# Patient Record
Sex: FEMALE | Race: WHITE | Employment: UNEMPLOYED
[De-identification: names, ages, dates, MRNs, and addresses within clinical notes are randomized per-mention and may not be internally consistent; named-entity substitution may affect disease eponyms.]

---

## 2017-01-24 ENCOUNTER — OFFICE VISIT (OUTPATIENT)
Dept: GASTROENTEROLOGY | Facility: CLINIC | Age: 57
End: 2017-01-24

## 2017-01-24 VITALS
OXYGEN SATURATION: 97 % | DIASTOLIC BLOOD PRESSURE: 128 MMHG | BODY MASS INDEX: 37.44 KG/M2 | TEMPERATURE: 98.2 F | SYSTOLIC BLOOD PRESSURE: 199 MMHG | WEIGHT: 247 LBS | HEART RATE: 74 BPM | HEIGHT: 68 IN

## 2017-01-24 DIAGNOSIS — K21.9 GASTROESOPHAGEAL REFLUX DISEASE WITHOUT ESOPHAGITIS: Primary | ICD-10-CM

## 2017-01-24 DIAGNOSIS — B19.20 HEPATITIS C VIRUS INFECTION WITHOUT HEPATIC COMA, UNSPECIFIED CHRONICITY: ICD-10-CM

## 2017-01-24 DIAGNOSIS — R76.8 HEPATITIS C ANTIBODY POSITIVE IN BLOOD: ICD-10-CM

## 2017-01-24 PROCEDURE — 99203 OFFICE O/P NEW LOW 30 MIN: CPT | Performed by: INTERNAL MEDICINE

## 2017-01-24 RX ORDER — CLONIDINE HYDROCHLORIDE 0.2 MG/1
0.2 TABLET ORAL 3 TIMES DAILY
COMMUNITY
End: 2017-08-23 | Stop reason: SINTOL

## 2017-01-24 ASSESSMENT — ENCOUNTER SYMPTOMS
EYE ITCHING: 1
SORE THROAT: 0
ABDOMINAL PAIN: 1
NAUSEA: 1
EYE PAIN: 1
COUGH: 0
VOMITING: 0
ABDOMINAL DISTENTION: 1
EYE REDNESS: 1
CONSTIPATION: 0
SHORTNESS OF BREATH: 1
DIARRHEA: 0
ANAL BLEEDING: 0
BLOOD IN STOOL: 0
TROUBLE SWALLOWING: 1
BACK PAIN: 1
RECTAL PAIN: 0

## 2017-02-07 ENCOUNTER — OFFICE VISIT (OUTPATIENT)
Dept: GASTROENTEROLOGY | Facility: CLINIC | Age: 57
End: 2017-02-07

## 2017-02-07 DIAGNOSIS — B19.20 HEPATITIS C VIRUS INFECTION WITHOUT HEPATIC COMA, UNSPECIFIED CHRONICITY: Primary | ICD-10-CM

## 2017-02-07 DIAGNOSIS — R74.8 ELEVATED LIVER ENZYMES: ICD-10-CM

## 2017-02-07 DIAGNOSIS — K21.9 GASTROESOPHAGEAL REFLUX DISEASE WITHOUT ESOPHAGITIS: ICD-10-CM

## 2017-02-07 PROCEDURE — 99213 OFFICE O/P EST LOW 20 MIN: CPT | Performed by: INTERNAL MEDICINE

## 2017-02-07 RX ORDER — BUPROPION HYDROCHLORIDE 150 MG/1
150 TABLET ORAL DAILY
COMMUNITY
Start: 2017-02-04 | End: 2017-08-23

## 2017-02-07 ASSESSMENT — ENCOUNTER SYMPTOMS
SORE THROAT: 0
EYE ITCHING: 1
COUGH: 0
TROUBLE SWALLOWING: 0
ANAL BLEEDING: 0
RECTAL PAIN: 0
BACK PAIN: 1
SHORTNESS OF BREATH: 0
CONSTIPATION: 0
ABDOMINAL PAIN: 0
EYE REDNESS: 1
NAUSEA: 1
VOMITING: 0
DIARRHEA: 0
BLOOD IN STOOL: 0
EYE PAIN: 1
ABDOMINAL DISTENTION: 1

## 2017-02-08 ENCOUNTER — TELEPHONE (OUTPATIENT)
Dept: GASTROENTEROLOGY | Facility: CLINIC | Age: 57
End: 2017-02-08

## 2017-02-08 DIAGNOSIS — B19.20 HEPATITIS C VIRUS INFECTION WITHOUT HEPATIC COMA, UNSPECIFIED CHRONICITY: Primary | ICD-10-CM

## 2017-02-08 DIAGNOSIS — R74.8 ELEVATED LIVER ENZYMES: ICD-10-CM

## 2017-02-09 ENCOUNTER — HOSPITAL ENCOUNTER (OUTPATIENT)
Age: 57
Setting detail: SPECIMEN
Discharge: HOME | End: 2017-02-09
Payer: MEDICAID

## 2017-02-09 DIAGNOSIS — B19.20 HEPATITIS C VIRUS INFECTION WITHOUT HEPATIC COMA, UNSPECIFIED CHRONICITY: ICD-10-CM

## 2017-02-09 DIAGNOSIS — R74.8 ELEVATED LIVER ENZYMES: ICD-10-CM

## 2017-02-09 LAB
AFP: 3.4 UG/L
AMPHETAMINE SCREEN URINE: NEGATIVE
BARBITURATE SCREEN URINE: NEGATIVE
BENZODIAZEPINE SCREEN, URINE: NEGATIVE
BUPRENORPHINE URINE: ABNORMAL
CANNABINOID SCREEN URINE: POSITIVE
COCAINE METABOLITE, URINE: NEGATIVE
HAV AB SERPL IA-ACNC: REACTIVE
HEPATITIS B SURFACE ANTIGEN: NONREACTIVE
MDMA URINE: ABNORMAL
METHADONE SCREEN, URINE: NEGATIVE
METHAMPHETAMINE, URINE: ABNORMAL
OPIATES, URINE: NEGATIVE
OXYCODONE SCREEN URINE: NEGATIVE
PHENCYCLIDINE, URINE: NEGATIVE
PROPOXYPHENE, URINE: ABNORMAL
TEST INFORMATION: ABNORMAL
TRICYCLIC ANTIDEPRESSANTS, UR: ABNORMAL

## 2017-02-09 PROCEDURE — 82977 ASSAY OF GGT: CPT

## 2017-02-09 PROCEDURE — 82105 ALPHA-FETOPROTEIN SERUM: CPT

## 2017-02-09 PROCEDURE — 83883 ASSAY NEPHELOMETRY NOT SPEC: CPT

## 2017-02-09 PROCEDURE — 84460 ALANINE AMINO (ALT) (SGPT): CPT

## 2017-02-09 PROCEDURE — 84520 ASSAY OF UREA NITROGEN: CPT

## 2017-02-09 PROCEDURE — 84450 TRANSFERASE (AST) (SGOT): CPT

## 2017-02-09 PROCEDURE — 87340 HEPATITIS B SURFACE AG IA: CPT

## 2017-02-09 PROCEDURE — 86704 HEP B CORE ANTIBODY TOTAL: CPT

## 2017-02-09 PROCEDURE — 86708 HEPATITIS A ANTIBODY: CPT

## 2017-02-09 PROCEDURE — 36415 COLL VENOUS BLD VENIPUNCTURE: CPT

## 2017-02-09 PROCEDURE — 80307 DRUG TEST PRSMV CHEM ANLYZR: CPT

## 2017-02-10 LAB — HEPATITIS B CORE TOTAL ANTIBODY: NONREACTIVE

## 2017-02-13 LAB
ALANINE AMINOTRANSFERASE, FIBROMETER: 67 U/L (ref 5–40)
ALPHA-2-MACROGLOBULIN, FIBROMETER: 321 MG/DL (ref 131–293)
ASPARTATE AMINOTRANSFERASE, FIBROMETER: 41 U/L (ref 9–40)
CIRRHOMETER PATIENT SCORE: 0.01
EER FIBROMETER REPORT: ABNORMAL
FIBROMETER INTERPRETATION: ABNORMAL
FIBROMETER PATIENT SCORE: 0.38
FIBROMETER PLATELET COUNT: 234
FIBROMETER PROTHROMBIN INDEX: 109 % (ref 90–120)
FIBROSIS METAVIR CLASSIFICATION: ABNORMAL
GAMMA GLUTAMYL TRANSFERASE, FIBROMETER: 62 U/L (ref 7–33)
INFLAMETER METAVIR CLASSIFICATION: ABNORMAL
INFLAMETER PATIENT SCORE: 0.42
UREA NITROGEN, FIBROMETER: 14 MG/DL (ref 7–20)

## 2017-02-15 ENCOUNTER — TELEPHONE (OUTPATIENT)
Dept: GASTROENTEROLOGY | Facility: CLINIC | Age: 57
End: 2017-02-15

## 2017-02-20 ENCOUNTER — NURSE ONLY (OUTPATIENT)
Dept: GASTROENTEROLOGY | Facility: CLINIC | Age: 57
End: 2017-02-20

## 2017-02-20 VITALS — WEIGHT: 249.6 LBS | BODY MASS INDEX: 40.11 KG/M2 | TEMPERATURE: 97.9 F | HEIGHT: 66 IN

## 2017-02-20 DIAGNOSIS — Z23 NEED FOR VIRAL IMMUNIZATION: Primary | ICD-10-CM

## 2017-02-20 PROCEDURE — 90471 IMMUNIZATION ADMIN: CPT | Performed by: INTERNAL MEDICINE

## 2017-02-20 PROCEDURE — 90746 HEPB VACCINE 3 DOSE ADULT IM: CPT | Performed by: INTERNAL MEDICINE

## 2017-03-22 ENCOUNTER — OFFICE VISIT (OUTPATIENT)
Dept: GASTROENTEROLOGY | Age: 57
End: 2017-03-22
Payer: MEDICAID

## 2017-03-22 VITALS
HEIGHT: 68 IN | WEIGHT: 255 LBS | TEMPERATURE: 97.5 F | SYSTOLIC BLOOD PRESSURE: 203 MMHG | DIASTOLIC BLOOD PRESSURE: 106 MMHG | HEART RATE: 69 BPM | BODY MASS INDEX: 38.65 KG/M2 | OXYGEN SATURATION: 98 %

## 2017-03-22 DIAGNOSIS — B19.20 HEPATITIS C VIRUS INFECTION WITHOUT HEPATIC COMA, UNSPECIFIED CHRONICITY: Primary | ICD-10-CM

## 2017-03-22 PROCEDURE — 90471 IMMUNIZATION ADMIN: CPT | Performed by: INTERNAL MEDICINE

## 2017-03-22 PROCEDURE — 90746 HEPB VACCINE 3 DOSE ADULT IM: CPT | Performed by: INTERNAL MEDICINE

## 2017-08-23 ENCOUNTER — TELEPHONE (OUTPATIENT)
Dept: GASTROENTEROLOGY | Age: 57
End: 2017-08-23

## 2018-03-28 ENCOUNTER — TELEPHONE (OUTPATIENT)
Dept: GASTROENTEROLOGY | Age: 58
End: 2018-03-28

## 2018-03-28 NOTE — TELEPHONE ENCOUNTER
Called patient to schedule her an appointment to see Dr Starlene Klinefelter so we can reapply for hepatitis c treatment. Patient last seen about a year ago. Patient declines appointment.

## 2020-06-11 ENCOUNTER — HOSPITAL ENCOUNTER (EMERGENCY)
Age: 60
Discharge: ANOTHER HOSPITAL | End: 2020-06-11
Attending: EMERGENCY MEDICINE
Payer: MEDICAID

## 2020-06-11 ENCOUNTER — APPOINTMENT (OUTPATIENT)
Dept: CT IMAGING | Age: 60
End: 2020-06-11
Payer: MEDICAID

## 2020-06-11 ENCOUNTER — APPOINTMENT (OUTPATIENT)
Dept: MRI IMAGING | Age: 60
DRG: 044 | End: 2020-06-11
Payer: MEDICAID

## 2020-06-11 ENCOUNTER — HOSPITAL ENCOUNTER (INPATIENT)
Age: 60
LOS: 6 days | Discharge: HOME HEALTH | DRG: 044 | End: 2020-06-17
Attending: EMERGENCY MEDICINE | Admitting: PSYCHIATRY & NEUROLOGY
Payer: MEDICAID

## 2020-06-11 ENCOUNTER — APPOINTMENT (OUTPATIENT)
Dept: GENERAL RADIOLOGY | Age: 60
DRG: 044 | End: 2020-06-11
Payer: MEDICAID

## 2020-06-11 VITALS
HEIGHT: 68 IN | TEMPERATURE: 97.7 F | HEART RATE: 75 BPM | BODY MASS INDEX: 40.01 KG/M2 | WEIGHT: 264 LBS | OXYGEN SATURATION: 98 % | RESPIRATION RATE: 15 BRPM | DIASTOLIC BLOOD PRESSURE: 92 MMHG | SYSTOLIC BLOOD PRESSURE: 213 MMHG

## 2020-06-11 PROBLEM — I61.3 PONTINE HEMORRHAGE (HCC): Status: ACTIVE | Noted: 2020-06-11

## 2020-06-11 PROBLEM — I62.9 INTRACRANIAL HEMORRHAGE (HCC): Status: ACTIVE | Noted: 2020-06-11

## 2020-06-11 LAB
ABSOLUTE EOS #: 0.1 K/UL (ref 0–0.4)
ABSOLUTE IMMATURE GRANULOCYTE: ABNORMAL K/UL (ref 0–0.3)
ABSOLUTE LYMPH #: 1.2 K/UL (ref 1–4.8)
ABSOLUTE MONO #: 0.4 K/UL (ref 0.1–1.3)
ALBUMIN SERPL-MCNC: 3.6 G/DL (ref 3.5–5.2)
ALBUMIN SERPL-MCNC: 3.7 G/DL (ref 3.5–5.2)
ALBUMIN/GLOBULIN RATIO: 1.1 (ref 1–2.5)
ALBUMIN/GLOBULIN RATIO: ABNORMAL (ref 1–2.5)
ALP BLD-CCNC: 104 U/L (ref 35–104)
ALP BLD-CCNC: 104 U/L (ref 35–104)
ALT SERPL-CCNC: 44 U/L (ref 5–33)
ALT SERPL-CCNC: 45 U/L (ref 5–33)
ANION GAP SERPL CALCULATED.3IONS-SCNC: 12 MMOL/L (ref 9–17)
ANION GAP SERPL CALCULATED.3IONS-SCNC: 12 MMOL/L (ref 9–17)
AST SERPL-CCNC: 29 U/L
AST SERPL-CCNC: 29 U/L
BASOPHILS # BLD: 1 % (ref 0–2)
BASOPHILS ABSOLUTE: 0 K/UL (ref 0–0.2)
BILIRUB SERPL-MCNC: 0.22 MG/DL (ref 0.3–1.2)
BILIRUB SERPL-MCNC: 0.29 MG/DL (ref 0.3–1.2)
BNP INTERPRETATION: ABNORMAL
BUN BLDV-MCNC: 23 MG/DL (ref 6–20)
BUN BLDV-MCNC: 26 MG/DL (ref 6–20)
BUN/CREAT BLD: ABNORMAL (ref 9–20)
BUN/CREAT BLD: ABNORMAL (ref 9–20)
CALCIUM SERPL-MCNC: 8.3 MG/DL (ref 8.6–10.4)
CALCIUM SERPL-MCNC: 8.7 MG/DL (ref 8.6–10.4)
CHLORIDE BLD-SCNC: 101 MMOL/L (ref 98–107)
CHLORIDE BLD-SCNC: 102 MMOL/L (ref 98–107)
CO2: 22 MMOL/L (ref 20–31)
CO2: 23 MMOL/L (ref 20–31)
CREAT SERPL-MCNC: 1.09 MG/DL (ref 0.5–0.9)
CREAT SERPL-MCNC: 1.09 MG/DL (ref 0.5–0.9)
DIFFERENTIAL TYPE: ABNORMAL
EKG ATRIAL RATE: 76 BPM
EKG P AXIS: 69 DEGREES
EKG P-R INTERVAL: 200 MS
EKG Q-T INTERVAL: 426 MS
EKG QRS DURATION: 106 MS
EKG QTC CALCULATION (BAZETT): 479 MS
EKG R AXIS: 28 DEGREES
EKG T AXIS: 37 DEGREES
EKG VENTRICULAR RATE: 76 BPM
EOSINOPHILS RELATIVE PERCENT: 3 % (ref 0–4)
GFR AFRICAN AMERICAN: >60 ML/MIN
GFR AFRICAN AMERICAN: >60 ML/MIN
GFR NON-AFRICAN AMERICAN: 44 ML/MIN
GFR NON-AFRICAN AMERICAN: 52 ML/MIN
GFR NON-AFRICAN AMERICAN: 52 ML/MIN
GFR SERPL CREATININE-BSD FRML MDRD: 54 ML/MIN
GFR SERPL CREATININE-BSD FRML MDRD: ABNORMAL ML/MIN/{1.73_M2}
GLUCOSE BLD-MCNC: 187 MG/DL (ref 65–105)
GLUCOSE BLD-MCNC: 201 MG/DL (ref 65–105)
GLUCOSE BLD-MCNC: 237 MG/DL (ref 65–105)
GLUCOSE BLD-MCNC: 333 MG/DL (ref 65–105)
GLUCOSE BLD-MCNC: 346 MG/DL (ref 70–99)
GLUCOSE BLD-MCNC: 414 MG/DL (ref 70–99)
GLUCOSE BLD-MCNC: 468 MG/DL (ref 65–105)
HCT VFR BLD CALC: 41.4 % (ref 36–46)
HEMOGLOBIN: 13.9 G/DL (ref 12–16)
IMMATURE GRANULOCYTES: ABNORMAL %
INR BLD: 0.9
LYMPHOCYTES # BLD: 21 % (ref 24–44)
MCH RBC QN AUTO: 29.6 PG (ref 26–34)
MCHC RBC AUTO-ENTMCNC: 33.5 G/DL (ref 31–37)
MCV RBC AUTO: 88.3 FL (ref 80–100)
MONOCYTES # BLD: 6 % (ref 1–7)
NRBC AUTOMATED: ABNORMAL PER 100 WBC
PARTIAL THROMBOPLASTIN TIME: 25.8 SEC (ref 24–36)
PDW BLD-RTO: 13.7 % (ref 11.5–14.9)
PLATELET # BLD: 237 K/UL (ref 150–450)
PLATELET ESTIMATE: ABNORMAL
PMV BLD AUTO: 8.8 FL (ref 6–12)
POC CREATININE: 1.24 MG/DL (ref 0.51–1.19)
POTASSIUM SERPL-SCNC: 4 MMOL/L (ref 3.7–5.3)
POTASSIUM SERPL-SCNC: 4 MMOL/L (ref 3.7–5.3)
PRO-BNP: 769 PG/ML
PROTHROMBIN TIME: 12.5 SEC (ref 11.8–14.6)
RBC # BLD: 4.69 M/UL (ref 4–5.2)
RBC # BLD: ABNORMAL 10*6/UL
SEG NEUTROPHILS: 69 % (ref 36–66)
SEGMENTED NEUTROPHILS ABSOLUTE COUNT: 4.1 K/UL (ref 1.3–9.1)
SODIUM BLD-SCNC: 135 MMOL/L (ref 135–144)
SODIUM BLD-SCNC: 137 MMOL/L (ref 135–144)
TOTAL PROTEIN: 6.8 G/DL (ref 6.4–8.3)
TOTAL PROTEIN: 7 G/DL (ref 6.4–8.3)
TROPONIN INTERP: ABNORMAL
TROPONIN T: ABNORMAL NG/ML
TROPONIN, HIGH SENSITIVITY: 15 NG/L (ref 0–14)
TROPONIN, HIGH SENSITIVITY: 19 NG/L (ref 0–14)
TROPONIN, HIGH SENSITIVITY: 21 NG/L (ref 0–14)
WBC # BLD: 5.9 K/UL (ref 3.5–11)
WBC # BLD: ABNORMAL 10*3/UL

## 2020-06-11 PROCEDURE — 2580000003 HC RX 258: Performed by: EMERGENCY MEDICINE

## 2020-06-11 PROCEDURE — 6360000004 HC RX CONTRAST MEDICATION: Performed by: EMERGENCY MEDICINE

## 2020-06-11 PROCEDURE — 6360000002 HC RX W HCPCS

## 2020-06-11 PROCEDURE — 82947 ASSAY GLUCOSE BLOOD QUANT: CPT

## 2020-06-11 PROCEDURE — 70450 CT HEAD/BRAIN W/O DYE: CPT

## 2020-06-11 PROCEDURE — 80307 DRUG TEST PRSMV CHEM ANLYZR: CPT

## 2020-06-11 PROCEDURE — 93005 ELECTROCARDIOGRAM TRACING: CPT | Performed by: EMERGENCY MEDICINE

## 2020-06-11 PROCEDURE — 6370000000 HC RX 637 (ALT 250 FOR IP): Performed by: STUDENT IN AN ORGANIZED HEALTH CARE EDUCATION/TRAINING PROGRAM

## 2020-06-11 PROCEDURE — 92523 SPEECH SOUND LANG COMPREHEN: CPT

## 2020-06-11 PROCEDURE — APPSS45 APP SPLIT SHARED TIME 31-45 MINUTES: Performed by: PHYSICIAN ASSISTANT

## 2020-06-11 PROCEDURE — 6370000000 HC RX 637 (ALT 250 FOR IP): Performed by: PSYCHIATRY & NEUROLOGY

## 2020-06-11 PROCEDURE — 85610 PROTHROMBIN TIME: CPT

## 2020-06-11 PROCEDURE — 2500000003 HC RX 250 WO HCPCS: Performed by: STUDENT IN AN ORGANIZED HEALTH CARE EDUCATION/TRAINING PROGRAM

## 2020-06-11 PROCEDURE — 2000000003 HC NEURO ICU R&B

## 2020-06-11 PROCEDURE — 97162 PT EVAL MOD COMPLEX 30 MIN: CPT

## 2020-06-11 PROCEDURE — 85025 COMPLETE CBC W/AUTO DIFF WBC: CPT

## 2020-06-11 PROCEDURE — 2580000003 HC RX 258: Performed by: STUDENT IN AN ORGANIZED HEALTH CARE EDUCATION/TRAINING PROGRAM

## 2020-06-11 PROCEDURE — 84484 ASSAY OF TROPONIN QUANT: CPT

## 2020-06-11 PROCEDURE — 85730 THROMBOPLASTIN TIME PARTIAL: CPT

## 2020-06-11 PROCEDURE — 36415 COLL VENOUS BLD VENIPUNCTURE: CPT

## 2020-06-11 PROCEDURE — 2500000003 HC RX 250 WO HCPCS: Performed by: EMERGENCY MEDICINE

## 2020-06-11 PROCEDURE — 36620 INSERTION CATHETER ARTERY: CPT

## 2020-06-11 PROCEDURE — 80053 COMPREHEN METABOLIC PANEL: CPT

## 2020-06-11 PROCEDURE — 70551 MRI BRAIN STEM W/O DYE: CPT

## 2020-06-11 PROCEDURE — 99285 EMERGENCY DEPT VISIT HI MDM: CPT

## 2020-06-11 PROCEDURE — 82565 ASSAY OF CREATININE: CPT

## 2020-06-11 PROCEDURE — 71045 X-RAY EXAM CHEST 1 VIEW: CPT

## 2020-06-11 PROCEDURE — 96375 TX/PRO/DX INJ NEW DRUG ADDON: CPT

## 2020-06-11 PROCEDURE — 97530 THERAPEUTIC ACTIVITIES: CPT

## 2020-06-11 PROCEDURE — 99223 1ST HOSP IP/OBS HIGH 75: CPT | Performed by: PSYCHIATRY & NEUROLOGY

## 2020-06-11 PROCEDURE — 83880 ASSAY OF NATRIURETIC PEPTIDE: CPT

## 2020-06-11 PROCEDURE — 6360000002 HC RX W HCPCS: Performed by: STUDENT IN AN ORGANIZED HEALTH CARE EDUCATION/TRAINING PROGRAM

## 2020-06-11 PROCEDURE — 96365 THER/PROPH/DIAG IV INF INIT: CPT

## 2020-06-11 PROCEDURE — 93010 ELECTROCARDIOGRAM REPORT: CPT | Performed by: INTERNAL MEDICINE

## 2020-06-11 PROCEDURE — 70498 CT ANGIOGRAPHY NECK: CPT

## 2020-06-11 PROCEDURE — 96374 THER/PROPH/DIAG INJ IV PUSH: CPT

## 2020-06-11 RX ORDER — HYDRALAZINE HYDROCHLORIDE 20 MG/ML
10 INJECTION INTRAMUSCULAR; INTRAVENOUS EVERY 6 HOURS PRN
Status: DISCONTINUED | OUTPATIENT
Start: 2020-06-11 | End: 2020-06-16

## 2020-06-11 RX ORDER — LABETALOL 200 MG/1
200 TABLET, FILM COATED ORAL 3 TIMES DAILY
Status: DISCONTINUED | OUTPATIENT
Start: 2020-06-11 | End: 2020-06-13

## 2020-06-11 RX ORDER — TIZANIDINE 4 MG/1
4 TABLET ORAL NIGHTLY
COMMUNITY

## 2020-06-11 RX ORDER — LABETALOL HYDROCHLORIDE 5 MG/ML
10 INJECTION, SOLUTION INTRAVENOUS EVERY 10 MIN PRN
Status: DISCONTINUED | OUTPATIENT
Start: 2020-06-11 | End: 2020-06-16

## 2020-06-11 RX ORDER — AMLODIPINE BESYLATE 10 MG/1
10 TABLET ORAL DAILY
Status: DISCONTINUED | OUTPATIENT
Start: 2020-06-11 | End: 2020-06-17 | Stop reason: HOSPADM

## 2020-06-11 RX ORDER — SODIUM CHLORIDE 0.9 % (FLUSH) 0.9 %
10 SYRINGE (ML) INJECTION PRN
Status: DISCONTINUED | OUTPATIENT
Start: 2020-06-11 | End: 2020-06-11 | Stop reason: HOSPADM

## 2020-06-11 RX ORDER — ENALAPRILAT 2.5 MG/2ML
1.25 INJECTION INTRAVENOUS ONCE
Status: DISCONTINUED | OUTPATIENT
Start: 2020-06-11 | End: 2020-06-11 | Stop reason: HOSPADM

## 2020-06-11 RX ORDER — 0.9 % SODIUM CHLORIDE 0.9 %
80 INTRAVENOUS SOLUTION INTRAVENOUS ONCE
Status: COMPLETED | OUTPATIENT
Start: 2020-06-11 | End: 2020-06-11

## 2020-06-11 RX ORDER — INSULIN GLARGINE 100 [IU]/ML
5 INJECTION, SOLUTION SUBCUTANEOUS NIGHTLY
Status: DISCONTINUED | OUTPATIENT
Start: 2020-06-11 | End: 2020-06-15

## 2020-06-11 RX ORDER — ONDANSETRON 2 MG/ML
4 INJECTION INTRAMUSCULAR; INTRAVENOUS EVERY 6 HOURS PRN
Status: DISCONTINUED | OUTPATIENT
Start: 2020-06-11 | End: 2020-06-17 | Stop reason: HOSPADM

## 2020-06-11 RX ORDER — HYDRALAZINE HYDROCHLORIDE 20 MG/ML
10 INJECTION INTRAMUSCULAR; INTRAVENOUS ONCE
Status: COMPLETED | OUTPATIENT
Start: 2020-06-11 | End: 2020-06-11

## 2020-06-11 RX ORDER — ACETAMINOPHEN 325 MG/1
650 TABLET ORAL EVERY 4 HOURS PRN
Status: DISCONTINUED | OUTPATIENT
Start: 2020-06-11 | End: 2020-06-17 | Stop reason: HOSPADM

## 2020-06-11 RX ORDER — LISINOPRIL 10 MG/1
10 TABLET ORAL DAILY
Status: DISCONTINUED | OUTPATIENT
Start: 2020-06-11 | End: 2020-06-11

## 2020-06-11 RX ORDER — ONDANSETRON 2 MG/ML
INJECTION INTRAMUSCULAR; INTRAVENOUS
Status: COMPLETED
Start: 2020-06-11 | End: 2020-06-11

## 2020-06-11 RX ORDER — SODIUM CHLORIDE 0.9 % (FLUSH) 0.9 %
10 SYRINGE (ML) INJECTION EVERY 12 HOURS SCHEDULED
Status: DISCONTINUED | OUTPATIENT
Start: 2020-06-11 | End: 2020-06-17 | Stop reason: HOSPADM

## 2020-06-11 RX ORDER — PROMETHAZINE HYDROCHLORIDE 25 MG/1
12.5 TABLET ORAL EVERY 6 HOURS PRN
Status: DISCONTINUED | OUTPATIENT
Start: 2020-06-11 | End: 2020-06-17 | Stop reason: HOSPADM

## 2020-06-11 RX ORDER — SPIRONOLACTONE 50 MG/1
50 TABLET, FILM COATED ORAL DAILY
COMMUNITY

## 2020-06-11 RX ORDER — SODIUM CHLORIDE 9 MG/ML
INJECTION, SOLUTION INTRAVENOUS CONTINUOUS
Status: DISCONTINUED | OUTPATIENT
Start: 2020-06-11 | End: 2020-06-13

## 2020-06-11 RX ORDER — NICOTINE POLACRILEX 4 MG
15 LOZENGE BUCCAL PRN
Status: DISCONTINUED | OUTPATIENT
Start: 2020-06-11 | End: 2020-06-17 | Stop reason: HOSPADM

## 2020-06-11 RX ORDER — DEXTROSE MONOHYDRATE 25 G/50ML
12.5 INJECTION, SOLUTION INTRAVENOUS PRN
Status: DISCONTINUED | OUTPATIENT
Start: 2020-06-11 | End: 2020-06-17 | Stop reason: HOSPADM

## 2020-06-11 RX ORDER — FENTANYL CITRATE 50 UG/ML
50 INJECTION, SOLUTION INTRAMUSCULAR; INTRAVENOUS ONCE
Status: COMPLETED | OUTPATIENT
Start: 2020-06-11 | End: 2020-06-11

## 2020-06-11 RX ORDER — ATORVASTATIN CALCIUM 80 MG/1
80 TABLET, FILM COATED ORAL NIGHTLY
Status: DISCONTINUED | OUTPATIENT
Start: 2020-06-11 | End: 2020-06-13

## 2020-06-11 RX ORDER — MIDAZOLAM HYDROCHLORIDE 1 MG/ML
1 INJECTION INTRAMUSCULAR; INTRAVENOUS ONCE
Status: COMPLETED | OUTPATIENT
Start: 2020-06-11 | End: 2020-06-11

## 2020-06-11 RX ORDER — SODIUM CHLORIDE 0.9 % (FLUSH) 0.9 %
10 SYRINGE (ML) INJECTION PRN
Status: DISCONTINUED | OUTPATIENT
Start: 2020-06-11 | End: 2020-06-17 | Stop reason: HOSPADM

## 2020-06-11 RX ORDER — DEXTROSE MONOHYDRATE 50 MG/ML
100 INJECTION, SOLUTION INTRAVENOUS PRN
Status: DISCONTINUED | OUTPATIENT
Start: 2020-06-11 | End: 2020-06-17 | Stop reason: HOSPADM

## 2020-06-11 RX ADMIN — SODIUM CHLORIDE 18 MG/HR: 9 INJECTION, SOLUTION INTRAVENOUS at 08:44

## 2020-06-11 RX ADMIN — SODIUM CHLORIDE 5 MG/HR: 9 INJECTION, SOLUTION INTRAVENOUS at 04:17

## 2020-06-11 RX ADMIN — INSULIN LISPRO 2 UNITS: 100 INJECTION, SOLUTION INTRAVENOUS; SUBCUTANEOUS at 18:10

## 2020-06-11 RX ADMIN — DEXTROSE MONOHYDRATE 20 MG/HR: 50 INJECTION, SOLUTION INTRAVENOUS at 07:42

## 2020-06-11 RX ADMIN — SODIUM CHLORIDE 10 MG/HR: 9 INJECTION, SOLUTION INTRAVENOUS at 14:00

## 2020-06-11 RX ADMIN — AMLODIPINE BESYLATE 10 MG: 10 TABLET ORAL at 17:32

## 2020-06-11 RX ADMIN — INSULIN LISPRO 2 UNITS: 100 INJECTION, SOLUTION INTRAVENOUS; SUBCUTANEOUS at 20:08

## 2020-06-11 RX ADMIN — LABETALOL HCL 200 MG: 200 TABLET, FILM COATED ORAL at 14:04

## 2020-06-11 RX ADMIN — SODIUM CHLORIDE 12.5 MG/HR: 9 INJECTION, SOLUTION INTRAVENOUS at 10:34

## 2020-06-11 RX ADMIN — INSULIN GLARGINE 5 UNITS: 100 INJECTION, SOLUTION SUBCUTANEOUS at 20:08

## 2020-06-11 RX ADMIN — Medication 10 ML: at 03:56

## 2020-06-11 RX ADMIN — FENTANYL CITRATE 50 MCG: 50 INJECTION, SOLUTION INTRAMUSCULAR; INTRAVENOUS at 06:15

## 2020-06-11 RX ADMIN — Medication 10 MG: at 08:28

## 2020-06-11 RX ADMIN — Medication 10 MG: at 07:42

## 2020-06-11 RX ADMIN — IOPAMIDOL 75 ML: 755 INJECTION, SOLUTION INTRAVENOUS at 03:56

## 2020-06-11 RX ADMIN — SODIUM CHLORIDE 10 MG/HR: 9 INJECTION, SOLUTION INTRAVENOUS at 23:39

## 2020-06-11 RX ADMIN — SODIUM CHLORIDE, PRESERVATIVE FREE 10 ML: 5 INJECTION INTRAVENOUS at 08:40

## 2020-06-11 RX ADMIN — LABETALOL HCL 200 MG: 200 TABLET, FILM COATED ORAL at 10:18

## 2020-06-11 RX ADMIN — ONDANSETRON 2 MG: 2 INJECTION, SOLUTION INTRAMUSCULAR; INTRAVENOUS at 06:23

## 2020-06-11 RX ADMIN — HYDRALAZINE HYDROCHLORIDE 10 MG: 20 INJECTION INTRAMUSCULAR; INTRAVENOUS at 18:03

## 2020-06-11 RX ADMIN — SODIUM CHLORIDE 80 ML: 9 INJECTION, SOLUTION INTRAVENOUS at 03:56

## 2020-06-11 RX ADMIN — INSULIN LISPRO 12 UNITS: 100 INJECTION, SOLUTION INTRAVENOUS; SUBCUTANEOUS at 08:39

## 2020-06-11 RX ADMIN — HYDRALAZINE HYDROCHLORIDE 10 MG: 20 INJECTION INTRAMUSCULAR; INTRAVENOUS at 05:35

## 2020-06-11 RX ADMIN — LISINOPRIL 10 MG: 10 TABLET ORAL at 10:18

## 2020-06-11 RX ADMIN — HYDRALAZINE HYDROCHLORIDE 10 MG: 20 INJECTION INTRAMUSCULAR; INTRAVENOUS at 14:36

## 2020-06-11 RX ADMIN — SODIUM CHLORIDE: 9 INJECTION, SOLUTION INTRAVENOUS at 10:13

## 2020-06-11 RX ADMIN — MIDAZOLAM HYDROCHLORIDE 1 MG: 1 INJECTION, SOLUTION INTRAMUSCULAR; INTRAVENOUS at 12:25

## 2020-06-11 RX ADMIN — DEXTROSE MONOHYDRATE 20 MG/HR: 50 INJECTION, SOLUTION INTRAVENOUS at 06:20

## 2020-06-11 RX ADMIN — SODIUM CHLORIDE 7.5 MG/HR: 9 INJECTION, SOLUTION INTRAVENOUS at 18:14

## 2020-06-11 RX ADMIN — INSULIN LISPRO 4 UNITS: 100 INJECTION, SOLUTION INTRAVENOUS; SUBCUTANEOUS at 14:03

## 2020-06-11 RX ADMIN — ATORVASTATIN CALCIUM 80 MG: 80 TABLET, FILM COATED ORAL at 20:08

## 2020-06-11 RX ADMIN — SODIUM CHLORIDE 10 MG/HR: 9 INJECTION, SOLUTION INTRAVENOUS at 21:17

## 2020-06-11 RX ADMIN — LABETALOL HCL 200 MG: 200 TABLET, FILM COATED ORAL at 20:08

## 2020-06-11 ASSESSMENT — PAIN DESCRIPTION - PAIN TYPE
TYPE: ACUTE PAIN;CHRONIC PAIN
TYPE: CHRONIC PAIN

## 2020-06-11 ASSESSMENT — PAIN SCALES - GENERAL
PAINLEVEL_OUTOF10: 10

## 2020-06-11 ASSESSMENT — PAIN DESCRIPTION - DESCRIPTORS
DESCRIPTORS: ACHING
DESCRIPTORS: HEADACHE;ACHING

## 2020-06-11 ASSESSMENT — PAIN DESCRIPTION - ORIENTATION
ORIENTATION: RIGHT
ORIENTATION: LEFT

## 2020-06-11 ASSESSMENT — PAIN DESCRIPTION - LOCATION
LOCATION: HEAD;LEG
LOCATION: KNEE

## 2020-06-11 ASSESSMENT — PAIN DESCRIPTION - FREQUENCY: FREQUENCY: CONTINUOUS

## 2020-06-11 NOTE — ED PROVIDER NOTES
of education: Not on file    Highest education level: Not on file   Occupational History    Not on file   Social Needs    Financial resource strain: Not on file    Food insecurity     Worry: Not on file     Inability: Not on file    Transportation needs     Medical: Not on file     Non-medical: Not on file   Tobacco Use    Smoking status: Current Every Day Smoker     Packs/day: 0.50     Years: 35.00     Pack years: 17.50     Types: Cigarettes    Smokeless tobacco: Never Used   Substance and Sexual Activity    Alcohol use: No    Drug use: No    Sexual activity: Not on file   Lifestyle    Physical activity     Days per week: Not on file     Minutes per session: Not on file    Stress: Not on file   Relationships    Social connections     Talks on phone: Not on file     Gets together: Not on file     Attends Worship service: Not on file     Active member of club or organization: Not on file     Attends meetings of clubs or organizations: Not on file     Relationship status: Not on file    Intimate partner violence     Fear of current or ex partner: Not on file     Emotionally abused: Not on file     Physically abused: Not on file     Forced sexual activity: Not on file   Other Topics Concern    Not on file   Social History Narrative    Not on file       Family History   Problem Relation Age of Onset    High Blood Pressure Mother     Cancer Sister         lymphoma       Allergies:    Bactrim [sulfamethoxazole-trimethoprim]    Home Medications:  Prior to Admission medications    Medication Sig Start Date End Date Taking? Authorizing Provider   ibuprofen (ADVIL;MOTRIN) 600 MG tablet Take 1 tablet by mouth every 6 hours as needed for Pain 6/1/16   Chris Dominguez MD       REVIEW OF SYSTEMS    (2-9 systems for level 4, 10 or more for level 5)        Gen: No Fever, No chills  EYES: No blurry visiion, no double vision  HENT: No sore throat, No runny nose.  No cough  CV: No CP , No palpitation  RESP: No SOB, the head and neck was performed with the administration of intravenous contrast. Multiplanar reformatted images are provided for review. MIP images are provided for review. Stenosis of the internal carotid arteries measured using NASCET criteria. Dose modulation, iterative reconstruction, and/or weight based adjustment of the mA/kV was utilized to reduce the radiation dose to as low as reasonably achievable. COMPARISON: None. HISTORY: ORDERING SYSTEM PROVIDED HISTORY: cva TECHNOLOGIST PROVIDED HISTORY: cva Reason for Exam: slurred speech, stroke alert, patient unable to hold still or follow instructions Acuity: Acute Type of Exam: Initial FINDINGS: CTA NECK: AORTIC ARCH/ARCH VESSELS: No dissection or arterial injury. No significant stenosis of the brachiocephalic or subclavian arteries. CAROTID ARTERIES: No dissection, arterial injury, or hemodynamically significant stenosis by NASCET criteria. VERTEBRAL ARTERIES: No dissection, arterial injury, or significant stenosis. SOFT TISSUES: The lung apices are clear. No cervical or superior mediastinal lymphadenopathy. The larynx and pharynx are unremarkable. No acute abnormality of the salivary and thyroid glands. BONES: No acute osseous abnormality. CTA HEAD: ANTERIOR CIRCULATION: No significant stenosis of the intracranial internal carotid, anterior cerebral, or middle cerebral arteries. No aneurysm. POSTERIOR CIRCULATION: No significant stenosis of the vertebral, basilar, or posterior cerebral arteries. No aneurysm. OTHER: No dural venous sinus thrombosis on this non-dedicated study. BRAIN: No mass effect or midline shift. No extra-axial fluid collection. The gray-white differentiation is maintained. Pontine hemorrhage seen to better advantage on noncontrast CT. Unremarkable CTA of the head and neck. CONSULTS:  IP CONSULT TO NEUROSURGERY  IP CONSULT TO CRITICAL CARE    CRITICAL CARE:  Please see attending note    FINAL IMPRESSION     1.  Intracranial

## 2020-06-11 NOTE — H&P
Cardene drip   - PRN Labetalol 10  - Neuro checks per protocol    CARDIOVASCULAR:  - Goal SBP <140  - Cardene drip  Try to wean   - Labetalol 100 TID  - Norvasc 10 mg   - PRN Labetalol   - Continue telemetry    PULMONARY:  -Maintaining oxygen saturation on room air  -Chest x-ray no acute cardiopulmonary    RENAL/FLUID/ELECTROLYTE:  - BUN 26/ Creatinine 1.09  - IVF:75/ml/hr   - Replace electrolytes PRN  - Daily BMP    GI/NUTRITION:  NUTRITION:  No diet orders on file  - Bowel regimen: Senna   - GI prophylaxis: Not indicated     ID:  - Tmax :  98.1  - Continue to monitor for fevers  - Daily CBC    HEME:   - H&H 13.9 & 41.4  - Platelets: 323  - Daily CBC    ENDOCRINE:  - Continue to monitor blood glucose, goal <180  -Glucose 346, repeat 444  - Lantus 5   -ISS    OTHER:  - PT/OT/ST : Ordered    PROPHYLAXIS:  Stress ulcer:     DVT PROPHYLAXIS:  -Hold off for 24 hours         DISPOSITION: NICU      Sridhar Bennett MD  Neuro Critical Care Service   Pager [phone]  6/11/2020     7:39 AM

## 2020-06-11 NOTE — ED NOTES
Bed: 11  Expected date: 6/11/20  Expected time: 3:33 AM  Means of arrival:   Comments:  Slurred speech Yodit Alonso, [facility]  06/11/20 [id number]

## 2020-06-11 NOTE — FLOWSHEET NOTE
[facility] CARE DEPARTMENT - [address]     Emergency/Trauma Note    PATIENT NAME: Alma Rosa Child    Shift date: 6/10/2020  Shift day: Wednesday  Shift # 3    Room # ED16   Name: Alma Rosa Hernandes            Age: 64 y.o. Gender: female          Protestant: [address] of Congregational: None    Trauma/Incident type: Stroke Alert  Admit Date & Time: 6/11/2020  4:53 AM  TRAUMA NAME: N/A    PATIENT/EVENT DESCRIPTION:  Alma Rosa Hernandes is a 64 y.o. female who arrived to ED16 and was paged out as a \"Stroke Alert. \" Per report, patient experienced \"sudden onset of headache, left-sided facial droop, and leg weakness. \" Patient was sitting up, awake, and talking with slurred speech, when  visited. Pt to be admitted to 0527/0527-01. SPIRITUAL ASSESSMENT/INTERVENTION:   responded to page and gathered patient information outside room.  introduced herself to patient in room. Patient was coping well and receptive of visit. Patient indicated that she did not want her son, Anselmo Harris, contacted at this time. Patient indicated that she \"did not want to scare him. \" Patient confirmed that her friend, [address], is aware of her arrival to the ED. Patient requested that Ray be added to her emergency contact list.  provided comfort, support, and care to patient in room. Patient thanked  for visit and support. PATIENT BELONGINGS:  No belongings noted    ANY BELONGINGS OF SIGNIFICANT VALUE NOTED:  N/A    REGISTRATION STAFF NOTIFIED? Yes      WHAT IS YOUR SPIRITUAL CARE PLAN FOR THIS PATIENT?:  [facility] can make follow-up visit, per request. Silvia Alyssa can be reached 24/7 via [place].     Electronically signed by Estrellita Palma, on 6/11/2020 at 9:09 AM.  Delbert Buckley  [phone]

## 2020-06-11 NOTE — ED NOTES
65 yo F R arm R leg weakness slurred speech,   Pontine bleed, no midline shift,   Sbp 200s, nicardipine gtt,   Spoke with Dr Elliot Juares, RN  06/11/20 1984

## 2020-06-11 NOTE — ED NOTES
Bed: 08  Expected date: 6/11/20  Expected time: 4:43 AM  Means of arrival: [facility]  Comments:  [facility]   abby Riojas RN  06/11/20 3411

## 2020-06-11 NOTE — ED PROVIDER NOTES
[facility]     Emergency Department     Faculty Attestation    I performed a history and physical examination of the patient and discussed management with the resident. I have reviewed and agree with the residents findings including all diagnostic interpretations, and treatment plans as written. Any areas of disagreement are noted on the chart. I was personally present for the key portions of any procedures. I have documented in the chart those procedures where I was not present during the key portions. I have reviewed the emergency nurses triage note. I agree with the chief complaint, past medical history, past surgical history, allergies, medications, social and family history as documented unless otherwise noted below. Documentation of the HPI, Physical Exam and Medical Decision Making performed by scribjaya is based on my personal performance of the HPI, PE and MDM. For Physician Assistant/ Nurse Practitioner cases/documentation I have personally evaluated this patient and have completed at least one if not all key elements of the E/M (history, physical exam, and MDM). Additional findings are as noted. 63 yo F transfer from [facility], 1230 pt woke with ha, slurred speech, L facial , r arm r leg weakness, no fever, no injury  pe chayo, slurred speech, L facial droop, weak R arm R leg, r arm drift,     Neuro icu admitting, bp improved, cardene gtt,   EKG Interpretation    Interpreted by me  Normal sinus, hr 79, no ischemia, , normal axis, qtc 488,     CRITICAL CARE: There was a high probability of clinically significant/life threatening deterioration in this patient's condition which required my urgent intervention. Total critical care time was 20 minutes. This excludes any time for separately reportable procedures.        Ada 24, DO  06/11/20 0501       Junior Bose, DO  06/11/20 Ilene 1429, DO  06/11/20

## 2020-06-11 NOTE — CONSULTS
Department of Neurosurgery                                            Nurse Practitioner Consult Note      Reason for Consult:  Pontine Bleed  Requesting Physician:  Dr. Bryan Ozuna  Neurosurgeon:   [] Dr. Valeria Councilman  [x] Dr. Halima Scott  [] Dr. Sanaz Al  [] Dr. Corey Valdovinos      History Obtained From:  patient    CHIEF COMPLAINT:         Chief Complaint   Patient presents with    Cerebrovascular Accident       HISTORY OF PRESENT ILLNESS:       The patient is a 64 y.o. female who presents with new onset right facial droop, right arm and right leg weakness, and slurred speech as of 12:30 this am. She has a history of hypertension and admits to being prescribed medication to control this, but has not taken for at least the last two months. She denies any anticoagulation or antiplatelet use. She admits to a mild headache. She denies nausea, vomiting, and blurred vision. She denies numbness or tingling of her face and extremities. PAST MEDICAL HISTORY :       Past Medical History:        Diagnosis Date    Chronic headache     Chronic headache     Elevated liver enzymes     Esophageal reflux     Exposure to hepatitis C     Hepatitis C     1a or 1b genotype    Hypertension     Major depression     Major depression        Past Surgical History:    No past surgical history on file. Social History:   Social History     Socioeconomic History    Marital status:       Spouse name: Not on file    Number of children: Not on file    Years of education: Not on file    Highest education level: Not on file   Occupational History    Not on file   Social Needs    Financial resource strain: Not on file    Food insecurity     Worry: Not on file     Inability: Not on file    Transportation needs     Medical: Not on file     Non-medical: Not on file   Tobacco Use    Smoking status: Current Every Day Smoker     Packs/day: 0.50     Years: 35.00     Pack years: 17.50     Types: Cigarettes    Smokeless tobacco: Never Used 06/11/2020    MCH 29.6 06/11/2020    MCHC 33.5 06/11/2020    RDW 13.7 06/11/2020    LYMPHOPCT 21 06/11/2020    MONOPCT 6 06/11/2020    BASOPCT 1 06/11/2020    MONOSABS 0.40 06/11/2020    LYMPHSABS 1.20 06/11/2020    EOSABS 0.10 06/11/2020    BASOSABS 0.00 06/11/2020    DIFFTYPE NOT REPORTED 06/11/2020     BMP:    Lab Results   Component Value Date     06/11/2020    K 4.0 06/11/2020     06/11/2020    CO2 23 06/11/2020    BUN 26 06/11/2020    LABALBU 3.6 06/11/2020    CREATININE 1.24 06/11/2020    CREATININE 1.09 06/11/2020    CALCIUM 8.7 06/11/2020    GFRAA >60 06/11/2020    LABGLOM 44 06/11/2020    GLUCOSE 346 06/11/2020       Radiology Review: Ct Head Wo Contrast    Result Date: 6/11/2020  EXAMINATION: CT OF THE HEAD WITHOUT CONTRAST  6/11/2020 3:36 am TECHNIQUE: CT of the head was performed without the administration of intravenous contrast. Dose modulation, iterative reconstruction, and/or weight based adjustment of the mA/kV was utilized to reduce the radiation dose to as low as reasonably achievable. COMPARISON: None. HISTORY: ORDERING SYSTEM PROVIDED HISTORY: cva TECHNOLOGIST PROVIDED HISTORY: cva Reason for Exam: slurred speech Acuity: Acute Type of Exam: Initial FINDINGS: BRAIN/VENTRICLES: There is a 1.7 x 0.8 cm focal intraparenchymal hemorrhage in the ventral linda. There is no mass effect or midline shift. No abnormal extra-axial fluid collection. The gray-white differentiation is maintained without evidence of an acute infarct. There is no evidence of hydrocephalus. ORBITS: The visualized portion of the orbits demonstrate no acute abnormality. SINUSES: The visualized paranasal sinuses and mastoid air cells demonstrate no acute abnormality. SOFT TISSUES/SKULL:  No acute abnormality of the visualized skull or soft tissues. Acute hemorrhage ventral linda. Dr. Tarsha Whitehead aware.      Cta Head Neck W Contrast    Result Date: 6/11/2020  EXAMINATION: CTA OF THE HEAD AND NECK WITH CONTRAST 6/11/2020

## 2020-06-11 NOTE — ED PROVIDER NOTES
and oriented, right arm and right leg weakness and dysarthria  Psych: Cooperative, appropriate mood and affect  -----------------------  -----------------------  Pt seen at: 0330  Code stroke called at: [id number] [address]  Last known normal: [0030  Spoke with Neurology: Orlene Colder  Is pt an endovascular candidate? [no]  -----------------------  NIH STROKE SCALE = [3]  -- 1a Level of consciousness: --  [x] Alert, keenly responsive (0)  [] Not alert, but arousable by minor stimulation (1)  [] Not alert; requires repeated stimulation (2)  [] Unresponsive or responds only with reflex (3)  -- 1b Level of consciousness questions: --  What is the month? What is your age? [x] Answers two questions correctly (0)  [] Answers one question correctly (1)  [] Answers neither question correctly (2)  -- 1c Level of consciousness commands: --  Open and close your eyes.  and release your hand  [x]  Performs both tasks correctly (0)  [] Performs one task correctly (1)  [] Performs neither task correctly (2)  -- 2 Best Gaze --  [x] Normal (0)  [] Partial gaze palsy (1)  [] Forced deviation (2)  -- 3 Visual --  [x] No visual loss (0)  [] Partial hemianopia (1)  [] Complete hemianopia (2)  [] Bilateral hemianopia (3)  -- 4 Facial palsy --  [x] Normal symmetric movements (0)  [] Minor paralysis (1)  [] Partial paralysis (2)  [] Complete paralysis of one or both sides (3)  -- 5 Motor Arm --  5a Left arm. [x] No drift (0)  [] Drift (1)  [] Some effort against gravity (2)  [] No effort against gravity; limb falls (3)  [] No movement (4)  5b Right arm. [] No drift (0)  [x] Drift (1)  [] Some effort against gravity (2)  [] No effort against gravity; limb falls (3)  [] No movement (4)  -- 6 Motor Leg --   6a Left Leg  [x] No drift (0)  [] Drift (1)  [] Some effort against gravity (2)  [] No effort against gravity; limb falls (3)  [] No movement (4)   6a Right leg.   [] No drift (0)  [x] Drift (1)  [] Some effort against gravity (2)  [] No effort against Result Value    Seg Neutrophils 69 (*)     Lymphocytes 21 (*)     All other components within normal limits   COMPREHENSIVE METABOLIC PANEL W/ REFLEX TO MG FOR LOW K - Abnormal; Notable for the following components:    Glucose 346 (*)     BUN 26 (*)     CREATININE 1.09 (*)     ALT 44 (*)     Total Bilirubin 0.22 (*)     GFR Non- 52 (*)     All other components within normal limits   BRAIN NATRIURETIC PEPTIDE - Abnormal; Notable for the following components:    Pro- (*)     All other components within normal limits   TROPONIN - Abnormal; Notable for the following components:    Troponin, High Sensitivity 21 (*)     All other components within normal limits   CREATININE W/GFR POINT OF CARE - Abnormal; Notable for the following components:    POC Creatinine 1.24 (*)     GFR Comment 54 (*)     GFR Non- 44 (*)     All other components within normal limits   PROTIME-INR   APTT   TROPONIN     EMERGENCY DEPARTMENTCOURSE:         Vitals:    Vitals:    06/11/20 0407 06/11/20 0408 06/11/20 0415 06/11/20 0440   BP:   (!) 213/92    Pulse:   75    Resp:   15    Temp:    97.7 °F (36.5 °C)   TempSrc:    Oral   SpO2:  98% 98%    Weight: 264 lb (119.7 kg)      Height: 5' 8\" (1.727 m)          The patient was given the following medications while in the emergency department:  Orders Placed This Encounter   Medications    iopamidol (ISOVUE-370) 76 % injection 75 mL    0.9 % sodium chloride bolus    DISCONTD: sodium chloride flush 0.9 % injection 10 mL    DISCONTD: niCARdipine (CARDENE) 25 mg in sodium chloride 0.9 % 250 mL infusion    DISCONTD: enalaprilat (VASOTEC) injection 1.25 mg     CONSULTS:  None    FINAL IMPRESSION      1. Intracranial hypertension          DISPOSITION/PLAN   DISPOSITION Decision To Transfer 06/11/2020 03:58:15 AM      PATIENT REFERRED TO:  No follow-up provider specified.   DISCHARGE MEDICATIONS:  Discharge Medication List as of 6/11/2020  4:46 AM        ANNITA FRAZIER

## 2020-06-12 ENCOUNTER — APPOINTMENT (OUTPATIENT)
Dept: CT IMAGING | Age: 60
DRG: 044 | End: 2020-06-12
Payer: MEDICAID

## 2020-06-12 LAB
ABSOLUTE EOS #: 0.09 K/UL (ref 0–0.44)
ABSOLUTE IMMATURE GRANULOCYTE: <0.03 K/UL (ref 0–0.3)
ABSOLUTE LYMPH #: 1.39 K/UL (ref 1.1–3.7)
ABSOLUTE MONO #: 0.41 K/UL (ref 0.1–1.2)
ANION GAP SERPL CALCULATED.3IONS-SCNC: 15 MMOL/L (ref 9–17)
BASOPHILS # BLD: 0 % (ref 0–2)
BASOPHILS ABSOLUTE: <0.03 K/UL (ref 0–0.2)
BUN BLDV-MCNC: 29 MG/DL (ref 6–20)
BUN/CREAT BLD: ABNORMAL (ref 9–20)
CALCIUM SERPL-MCNC: 8.6 MG/DL (ref 8.6–10.4)
CHLORIDE BLD-SCNC: 106 MMOL/L (ref 98–107)
CHOLESTEROL/HDL RATIO: 4.9
CHOLESTEROL: 163 MG/DL
CO2: 20 MMOL/L (ref 20–31)
CREAT SERPL-MCNC: 1.43 MG/DL (ref 0.5–0.9)
DIFFERENTIAL TYPE: ABNORMAL
EKG ATRIAL RATE: 79 BPM
EKG P AXIS: 69 DEGREES
EKG P-R INTERVAL: 194 MS
EKG Q-T INTERVAL: 426 MS
EKG QRS DURATION: 108 MS
EKG QTC CALCULATION (BAZETT): 488 MS
EKG R AXIS: 27 DEGREES
EKG T AXIS: 51 DEGREES
EKG VENTRICULAR RATE: 79 BPM
EOSINOPHILS RELATIVE PERCENT: 1 % (ref 1–4)
ESTIMATED AVERAGE GLUCOSE: 220 MG/DL
GFR AFRICAN AMERICAN: 46 ML/MIN
GFR NON-AFRICAN AMERICAN: 38 ML/MIN
GFR SERPL CREATININE-BSD FRML MDRD: ABNORMAL ML/MIN/{1.73_M2}
GFR SERPL CREATININE-BSD FRML MDRD: ABNORMAL ML/MIN/{1.73_M2}
GLUCOSE BLD-MCNC: 154 MG/DL (ref 65–105)
GLUCOSE BLD-MCNC: 167 MG/DL (ref 65–105)
GLUCOSE BLD-MCNC: 203 MG/DL (ref 65–105)
GLUCOSE BLD-MCNC: 219 MG/DL (ref 70–99)
HBA1C MFR BLD: 9.3 % (ref 4–6)
HCT VFR BLD CALC: 40.3 % (ref 36.3–47.1)
HDLC SERPL-MCNC: 33 MG/DL
HEMOGLOBIN: 13.2 G/DL (ref 11.9–15.1)
IMMATURE GRANULOCYTES: 0 %
LDL CHOLESTEROL: 75 MG/DL (ref 0–130)
LYMPHOCYTES # BLD: 20 % (ref 24–43)
MAGNESIUM: 2 MG/DL (ref 1.6–2.6)
MCH RBC QN AUTO: 29.6 PG (ref 25.2–33.5)
MCHC RBC AUTO-ENTMCNC: 32.8 G/DL (ref 28.4–34.8)
MCV RBC AUTO: 90.4 FL (ref 82.6–102.9)
MONOCYTES # BLD: 6 % (ref 3–12)
NRBC AUTOMATED: 0 PER 100 WBC
PDW BLD-RTO: 13.5 % (ref 11.8–14.4)
PHOSPHORUS: 3.5 MG/DL (ref 2.6–4.5)
PLATELET # BLD: 274 K/UL (ref 138–453)
PLATELET ESTIMATE: ABNORMAL
PMV BLD AUTO: 10.9 FL (ref 8.1–13.5)
POTASSIUM SERPL-SCNC: 4 MMOL/L (ref 3.7–5.3)
RBC # BLD: 4.46 M/UL (ref 3.95–5.11)
RBC # BLD: ABNORMAL 10*6/UL
SEG NEUTROPHILS: 73 % (ref 36–65)
SEGMENTED NEUTROPHILS ABSOLUTE COUNT: 5.08 K/UL (ref 1.5–8.1)
SODIUM BLD-SCNC: 141 MMOL/L (ref 135–144)
TRIGL SERPL-MCNC: 277 MG/DL
TROPONIN INTERP: ABNORMAL
TROPONIN INTERP: ABNORMAL
TROPONIN T: ABNORMAL NG/ML
TROPONIN T: ABNORMAL NG/ML
TROPONIN, HIGH SENSITIVITY: 29 NG/L (ref 0–14)
TROPONIN, HIGH SENSITIVITY: 30 NG/L (ref 0–14)
VLDLC SERPL CALC-MCNC: ABNORMAL MG/DL (ref 1–30)
WBC # BLD: 7 K/UL (ref 3.5–11.3)
WBC # BLD: ABNORMAL 10*3/UL

## 2020-06-12 PROCEDURE — 70450 CT HEAD/BRAIN W/O DYE: CPT

## 2020-06-12 PROCEDURE — 6370000000 HC RX 637 (ALT 250 FOR IP): Performed by: STUDENT IN AN ORGANIZED HEALTH CARE EDUCATION/TRAINING PROGRAM

## 2020-06-12 PROCEDURE — 83036 HEMOGLOBIN GLYCOSYLATED A1C: CPT

## 2020-06-12 PROCEDURE — 2580000003 HC RX 258: Performed by: STUDENT IN AN ORGANIZED HEALTH CARE EDUCATION/TRAINING PROGRAM

## 2020-06-12 PROCEDURE — 92507 TX SP LANG VOICE COMM INDIV: CPT

## 2020-06-12 PROCEDURE — 80048 BASIC METABOLIC PNL TOTAL CA: CPT

## 2020-06-12 PROCEDURE — 97166 OT EVAL MOD COMPLEX 45 MIN: CPT | Performed by: OCCUPATIONAL THERAPIST

## 2020-06-12 PROCEDURE — 2060000000 HC ICU INTERMEDIATE R&B

## 2020-06-12 PROCEDURE — 84100 ASSAY OF PHOSPHORUS: CPT

## 2020-06-12 PROCEDURE — 6360000002 HC RX W HCPCS: Performed by: NURSE PRACTITIONER

## 2020-06-12 PROCEDURE — 80061 LIPID PANEL: CPT

## 2020-06-12 PROCEDURE — 93010 ELECTROCARDIOGRAM REPORT: CPT | Performed by: INTERNAL MEDICINE

## 2020-06-12 PROCEDURE — 99233 SBSQ HOSP IP/OBS HIGH 50: CPT | Performed by: PSYCHIATRY & NEUROLOGY

## 2020-06-12 PROCEDURE — 97535 SELF CARE MNGMENT TRAINING: CPT | Performed by: OCCUPATIONAL THERAPIST

## 2020-06-12 PROCEDURE — 85025 COMPLETE CBC W/AUTO DIFF WBC: CPT

## 2020-06-12 PROCEDURE — 6360000002 HC RX W HCPCS: Performed by: STUDENT IN AN ORGANIZED HEALTH CARE EDUCATION/TRAINING PROGRAM

## 2020-06-12 PROCEDURE — 2500000003 HC RX 250 WO HCPCS: Performed by: STUDENT IN AN ORGANIZED HEALTH CARE EDUCATION/TRAINING PROGRAM

## 2020-06-12 PROCEDURE — 82947 ASSAY GLUCOSE BLOOD QUANT: CPT

## 2020-06-12 PROCEDURE — 97530 THERAPEUTIC ACTIVITIES: CPT

## 2020-06-12 PROCEDURE — 83735 ASSAY OF MAGNESIUM: CPT

## 2020-06-12 PROCEDURE — 84484 ASSAY OF TROPONIN QUANT: CPT

## 2020-06-12 RX ORDER — TIZANIDINE 4 MG/1
4 TABLET ORAL NIGHTLY
Status: DISCONTINUED | OUTPATIENT
Start: 2020-06-12 | End: 2020-06-13

## 2020-06-12 RX ORDER — SENNA PLUS 8.6 MG/1
1 TABLET ORAL NIGHTLY
Status: DISCONTINUED | OUTPATIENT
Start: 2020-06-12 | End: 2020-06-17 | Stop reason: HOSPADM

## 2020-06-12 RX ORDER — TIZANIDINE 4 MG/1
4 TABLET ORAL NIGHTLY
Status: DISCONTINUED | OUTPATIENT
Start: 2020-06-12 | End: 2020-06-12

## 2020-06-12 RX ORDER — CALCIUM CARBONATE 200(500)MG
500 TABLET,CHEWABLE ORAL 3 TIMES DAILY PRN
Status: DISCONTINUED | OUTPATIENT
Start: 2020-06-12 | End: 2020-06-17 | Stop reason: HOSPADM

## 2020-06-12 RX ORDER — 0.9 % SODIUM CHLORIDE 0.9 %
500 INTRAVENOUS SOLUTION INTRAVENOUS ONCE
Status: COMPLETED | OUTPATIENT
Start: 2020-06-12 | End: 2020-06-12

## 2020-06-12 RX ADMIN — INSULIN LISPRO 4 UNITS: 100 INJECTION, SOLUTION INTRAVENOUS; SUBCUTANEOUS at 12:38

## 2020-06-12 RX ADMIN — ACETAMINOPHEN 650 MG: 325 TABLET ORAL at 20:49

## 2020-06-12 RX ADMIN — SODIUM CHLORIDE 500 ML: 0.9 INJECTION, SOLUTION INTRAVENOUS at 05:00

## 2020-06-12 RX ADMIN — ATORVASTATIN CALCIUM 80 MG: 80 TABLET, FILM COATED ORAL at 20:50

## 2020-06-12 RX ADMIN — TIZANIDINE 4 MG: 4 TABLET ORAL at 20:49

## 2020-06-12 RX ADMIN — HYDRALAZINE HYDROCHLORIDE 10 MG: 20 INJECTION INTRAMUSCULAR; INTRAVENOUS at 04:01

## 2020-06-12 RX ADMIN — INSULIN LISPRO 4 UNITS: 100 INJECTION, SOLUTION INTRAVENOUS; SUBCUTANEOUS at 09:12

## 2020-06-12 RX ADMIN — TIZANIDINE 4 MG: 4 TABLET ORAL at 05:13

## 2020-06-12 RX ADMIN — SODIUM CHLORIDE 7.5 MG/HR: 9 INJECTION, SOLUTION INTRAVENOUS at 02:09

## 2020-06-12 RX ADMIN — ENOXAPARIN SODIUM 30 MG: 30 INJECTION SUBCUTANEOUS at 09:44

## 2020-06-12 RX ADMIN — INSULIN GLARGINE 5 UNITS: 100 INJECTION, SOLUTION SUBCUTANEOUS at 20:50

## 2020-06-12 RX ADMIN — INSULIN LISPRO 2 UNITS: 100 INJECTION, SOLUTION INTRAVENOUS; SUBCUTANEOUS at 17:51

## 2020-06-12 RX ADMIN — HYDRALAZINE HYDROCHLORIDE 10 MG: 20 INJECTION INTRAMUSCULAR; INTRAVENOUS at 13:42

## 2020-06-12 RX ADMIN — ENOXAPARIN SODIUM 30 MG: 30 INJECTION SUBCUTANEOUS at 20:49

## 2020-06-12 RX ADMIN — ANTACID TABLETS 500 MG: 500 TABLET, CHEWABLE ORAL at 05:13

## 2020-06-12 RX ADMIN — INSULIN LISPRO 1 UNITS: 100 INJECTION, SOLUTION INTRAVENOUS; SUBCUTANEOUS at 20:50

## 2020-06-12 RX ADMIN — SODIUM CHLORIDE, PRESERVATIVE FREE 10 ML: 5 INJECTION INTRAVENOUS at 09:00

## 2020-06-12 ASSESSMENT — PAIN SCALES - GENERAL
PAINLEVEL_OUTOF10: 4
PAINLEVEL_OUTOF10: 0

## 2020-06-12 ASSESSMENT — PAIN DESCRIPTION - ONSET: ONSET: ON-GOING

## 2020-06-12 ASSESSMENT — PAIN DESCRIPTION - LOCATION: LOCATION: HEAD

## 2020-06-12 ASSESSMENT — PAIN DESCRIPTION - PAIN TYPE: TYPE: ACUTE PAIN

## 2020-06-12 ASSESSMENT — PAIN DESCRIPTION - DESCRIPTORS: DESCRIPTORS: HEADACHE

## 2020-06-12 ASSESSMENT — PAIN DESCRIPTION - PROGRESSION: CLINICAL_PROGRESSION: NOT CHANGED

## 2020-06-12 ASSESSMENT — PAIN - FUNCTIONAL ASSESSMENT: PAIN_FUNCTIONAL_ASSESSMENT: ACTIVITIES ARE NOT PREVENTED

## 2020-06-12 ASSESSMENT — PAIN DESCRIPTION - FREQUENCY: FREQUENCY: INTERMITTENT

## 2020-06-12 NOTE — PROGRESS NOTES
Units Subcutaneous TID     insulin lispro  0-6 Units Subcutaneous Nightly    insulin glargine  5 Units Subcutaneous Nightly    labetalol  200 mg Oral TID    amLODIPine  10 mg Oral Daily     CONTINUOUS INFUSIONS:   dextrose      niCARdipene (CARDENE) infusion Stopped (20)    sodium chloride 75 mL/hr at 20 1013     PRN MEDICATIONS:   calcium carbonate, sodium chloride flush, acetaminophen, promethazine **OR** ondansetron, labetalol, glucose, dextrose, glucagon (rDNA), dextrose, hydrALAZINE    VITALS:  Temperature Range: Temp: 98.4 °F (36.9 °C) Temp  Av.9 °F (36.6 °C)  Min: 97.6 °F (36.4 °C)  Max: 98.4 °F (36.9 °C)  BP Range: Systolic (11OZP), QJL:651 , Min:89 , BUQ:450     Diastolic (22KPY), WID:74, Min:37, Max:177    Pulse Range: Pulse  Av.8  Min: 60  Max: 83  Respiration Range: Resp  Av.2  Min: 13  Max: 24  Current Pulse Ox: SpO2: 93 %  24HR Pulse Ox Range: SpO2  Av.2 %  Min: 85 %  Max: 98 %  Patient Vitals for the past 12 hrs:   BP Temp Temp src Pulse Resp SpO2   20 0502 (!) 146/80 -- -- 78 22 93 %   20 0402 130/74 98.4 °F (36.9 °C) Oral 71 18 94 %   20 0347 -- -- -- 71 15 95 %   20 0332 -- -- -- 73 17 94 %   20 0317 -- -- -- 64 16 92 %   20 0302 (!) 115/58 -- -- 64 15 93 %   20 0300 -- -- -- 63 16 92 %   20 0245 -- -- -- 62 16 91 %   20 0230 -- -- -- 65 17 92 %   20 0215 -- -- -- 69 18 94 %   20 0200 114/63 -- -- 69 17 93 %   20 0145 -- -- -- 72 17 (!) 88 %   20 0130 -- -- -- 78 21 94 %   20 0115 -- -- -- 70 17 97 %   20 0100 -- -- -- 63 16 96 %   20 0045 -- -- -- 68 16 95 %   20 0030 -- -- -- 64 15 91 %   20 0015 -- -- -- 66 16 97 %   20 0000 (!) 100/54 98 °F (36.7 °C) Oral 62 15 98 %   20 2345 -- -- -- 68 15 98 %   20 2330 -- -- -- 60 15 97 %   20 2315 -- -- -- 62 15 98 %   20 2300 -- -- -- 70 19 98 %   20 2245 -- -- -- 68 14 seconds  6b. Motor right le - no drift; leg holds 30 degree position for full 5 seconds  7. Limb Ataxia:  0 - absent  8. Sensory:  0 - normal; no sensory loss  9. Best Language:  1 - mild to moderate aphasia; some obvious loss of fluency or facility of comprehension without significant limitation on ideas expressed or form of expression. Reduction of speech and/or comprehension, however, makes conversation about provided materials difficult or impossible. For example, in conversation about provided materials, examiner can identify picture or naming card content from patient's response. 10.  Dysarthria:  1 - mild to moderate, patient slurs at least some words and at worst, can be understood with some difficulty  11. Extinction and Inattention:  0 - no abnormality  TOTAL:     DRAINS:  [x] There are no drains for Neuro Critical Care to monitor at this time. ASSESSMENT AND PLAN:       This is a 64 y.o. female with history of hypertension, diabetes transfer from  [address] [place] [address] 12.30 am  yesterday with sudden onset of headache associated with dysarthria and left-sided facial droop with right arm and leg weakness. CT head demonstrating acute hemorrhage in ventral linda, CTA: Unremarkable. On Cardene drip with as needed labetalol.     Patient care will be discussed with attending, will reevaluate patient along with attending.      NEUROLOGIC:  - Imaging  : Repeat Heat CT stable intraparenchymal hemorrhage   - Goal SBP <140  - Neuro checks per protocol    CARDIOVASCULAR:  - Goal SBP <140  -Labetalol with holding parameter    - Amlodipine with holding parameter   - Continue telemetry    PULMONARY:  - Patient desaturates  During sleep  - Maintains O2 during awake above 98    RENAL/FLUID/ELECTROLYTE:  - BUN 29/ Creatinine 1.43  - Urine output ml/kg/hr  - IVF: 100  - Replace electrolytes PRN  - Daily BMP    GI/NUTRITION:  NUTRITION:  DIET CARB CONTROL;  - Bowel regimen: Senna  - GI

## 2020-06-12 NOTE — PROGRESS NOTES
Education & Training, Home Exercise Program, Patient/Caregiver Education & Training  Safety Devices  Type of devices: Call light within reach, Gait belt, Patient at risk for falls, Nurse notified, Chair alarm in place, Left in chair  Restraints  Initially in place: No     Therapy Time   Individual Concurrent Group Co-treatment   Time In 0825         Time Out 0850         Minutes 25                 [place]ay [place], [place]

## 2020-06-12 NOTE — PROGRESS NOTES
Notified THERESA Caban CM, that per Dr Nathalie Rosa pt appears approp for ARU.  Writer will initiate precert for ARU after OT eval.

## 2020-06-12 NOTE — CONSULTS
swing, Decreased head and trunk rotation, Shuffles  Distance: ~10 steps bed to chair  Comments: (pt requested not to sit up in chair b/c she's exhausted and wants to sleep; agreeable to participate with PT session)        OT: Pending, unable to evaluate yesterday due to lethargy from Versed. ST:     Pt presents with no apparent cognitive deficits at this time. + moderate dysarthria noted, no oral motor deficits. Further ST is recommended. Verbal and written education provided. Further therapy recommended at discharge.          Past Medical History:        Diagnosis Date    Chronic headache     Chronic headache     Elevated liver enzymes     Esophageal reflux     Exposure to hepatitis C     Hepatitis C     1a or 1b genotype    Hypertension     Major depression     Major depression        Past Surgical History:    No past surgical history on file. Allergies:     Allergies   Allergen Reactions    Bactrim [Sulfamethoxazole-Trimethoprim]         Current Medications:   Current Facility-Administered Medications: tiZANidine (ZANAFLEX) tablet 4 mg, 4 mg, Oral, Nightly  calcium carbonate (TUMS) chewable tablet 500 mg, 500 mg, Oral, TID PRN  senna (SENOKOT) tablet 8.6 mg, 1 tablet, Oral, Nightly  enoxaparin (LOVENOX) injection 30 mg, 30 mg, Subcutaneous, BID  sodium chloride flush 0.9 % injection 10 mL, 10 mL, Intravenous, 2 times per day  sodium chloride flush 0.9 % injection 10 mL, 10 mL, Intravenous, PRN  acetaminophen (TYLENOL) tablet 650 mg, 650 mg, Oral, Q4H PRN  promethazine (PHENERGAN) tablet 12.5 mg, 12.5 mg, Oral, Q6H PRN **OR** ondansetron (ZOFRAN) injection 4 mg, 4 mg, Intravenous, Q6H PRN  atorvastatin (LIPITOR) tablet 80 mg, 80 mg, Oral, Nightly  labetalol (NORMODYNE;TRANDATE) injection 10 mg, 10 mg, Intravenous, Q10 Min PRN  insulin lispro (HUMALOG) injection vial 0-12 Units, 0-12 Units, Subcutaneous, TID WC  insulin lispro (HUMALOG) injection vial 0-6 Units, 0-6 Units, Subcutaneous, Physically abused: Not on file     Forced sexual activity: Not on file   Other Topics Concern    Not on file   Social History Narrative    Not on file     Social/Functional History  Lives With: Alone  Type of Home: (\"camper\" per pt)  Home Layout: One level  Home Access: Stairs to enter without rails  Entrance Stairs - Number of Steps: 3  ADL Assistance: Independent  Ambulation Assistance: Independent  Transfer Assistance: Independent  Active : Yes  Mode of Transportation: Car  Occupation: On disability    Family History:       Problem Relation Age of Onset    High Blood Pressure Mother     Cancer Sister         lymphoma           BP (!) 95/58   Pulse 52   Temp 97.8 °F (36.6 °C)   Resp 14   Ht 5' 7\" (1.702 m)   Wt 254 lb 10.1 oz (115.5 kg)   LMP 11/09/2014   SpO2 93%   BMI 39.88 kg/m²       Diagnostics:  CBC   Lab Results   Component Value Date    WBC 7.0 06/12/2020    RBC 4.46 06/12/2020    HGB 13.2 06/12/2020    HCT 40.3 06/12/2020    MCV 90.4 06/12/2020    RDW 13.5 06/12/2020     06/12/2020     BMP    Lab Results   Component Value Date     06/12/2020    K 4.0 06/12/2020     06/12/2020    CO2 20 06/12/2020    BUN 29 06/12/2020     Uric Acid  No components found for: URIC  VITAMIN B12 No components found for: B12  PT/INR  No results found for: PTINR    Radiology:     Impression: Ms. Inez Block is a 64 y.o. male with a history of <principal problem not specified>    1. Intraparenchymal hemorrhage  2. Exposure hepatitis C/elevated liver enzymes  3. Hypertension hyperlipidemia-Norvasc, Lipitor, Cardene drip  4. History of depression  5. Diabetes-on insulin  6. Acute renal failure-creatinine increased to 1.43    Recommendations:  1. Diagnosis: Intraparenchymal hemorrhage  2. Therapy: Has PT needs, suspect will have OT needs though OT pending, speech needs for moderate dysarthria  3. Medical  Necessity: As above  4. Support: Clarify  5.  Rehab recommendation: Has PT and speech

## 2020-06-12 NOTE — PLAN OF CARE
Problem: Nutrition  Goal: Optimal nutrition therapy  Description: Nutrition Problem: Increased nutrient needs  Intervention: Food and/or Nutrient Delivery: Continue current diet, Start ONS  Nutritional Goals: Meet greater than 50% of estimated nutrient needs with intake of therapeutic diet. 6/12/2020 1256 by Elisa Hayward MS, RD, LD  Outcome: Ongoing  Note: Nutrition Problem: Increased nutrient needs  Intervention: Food and/or Nutrient Delivery: Continue current diet, Start ONS  Nutritional Goals: Meet greater than 50% of estimated nutrient needs with intake of therapeutic diet.

## 2020-06-12 NOTE — PROGRESS NOTES
Nutrition Assessment    Type and Reason for Visit: Initial, Positive Nutrition Screen(Wound)    Nutrition Recommendations: Send wound healing ONS twice daily. If swallowing difficulty observed, suggest NPO and SLP consult to evaluate swallowing. Nutrition Assessment: Pt admitted with slurred speech, difficulty walking, and headache. States she has a non-healing wound on the back of her left leg - says a table fell on her 5 months ago and has not yet healed. Says she did not see anyone about this wound d/t doctor office restrictions with current Covid pandemic. Reports good appetite PTA and denies any significant weight changes. Reports blood glucose runs below 150 mg/dL at home. C/o difficulty swallowing pancakes this morning (feels they \"got stuck\" in throat), but says she is able to swallow liquids well. Pt willing to receive wound healing ONS at this time. Malnutrition Assessment:  · Malnutrition Status: No malnutrition  · Context: Acute illness or injury  · Findings of the 6 clinical characteristics of malnutrition (Minimum of 2 out of 6 clinical characteristics is required to make the diagnosis of moderate or severe Protein Calorie Malnutrition based on AND/ASPEN Guidelines):  1. Energy Intake-Greater than 75% of estimated energy requirement, Greater than or equal to 1 month    2. Weight Loss-No significant weight loss(per pt),    3. Fat Loss-No significant subcutaneous fat loss,    4. Muscle Loss-No significant muscle mass loss,    5. Fluid Accumulation-Mild fluid accumulation, Extremities  6.  Strength-Not measured    Nutrition Risk Level:  Moderate    Nutrient Needs:  · Estimated Daily Total Kcal: 8313-7296 kcals/day  · Estimated Daily Protein (g):  gm/day    Nutrition Diagnosis:   · Problem: Increased nutrient needs  · Etiology: related to (healing)     Signs and symptoms:  as evidenced by (non-healing, open wound on LLE)    Objective Information:  · Nutrition-Focused Physical Findings: Glucose 203-219 mg/dL  · Wound Type: Open Wounds(Non-healing posterior LLE wound - open per pt)  · Current Nutrition Therapies:  · Oral Diet Orders: Carb Control 4 Carbs/Meal   · Oral Diet intake: 1-25%, 26-50%(Reports eating fruit this morning for breakfast)  · Oral Nutrition Supplement (ONS) Orders: None  · Anthropometric Measures:  · Ht: 5' 7\" (170.2 cm)   · Current Body Wt: 254 lb 10.1 oz (115.5 kg)  · Usual Body Wt: (250-264 lbs per pt)  · Ideal Body Wt: 135 lb (61.2 kg), % Ideal Body 189%  · BMI Classification: BMI > or equal to 40.0 Obese Class III    Nutrition Interventions:   Continue current diet, Start ONS  Continued Inpatient Monitoring, Education Not Indicated    Nutrition Evaluation:   · Evaluation: Goals set   · Goals: Meet greater than 50% of estimated nutrient needs with intake of therapeutic diet.     · Monitoring: Meal Intake, Supplement Intake, Diet Tolerance, Skin Integrity, Wound Healing, Weight, Pertinent Labs, Monitor Hemodynamic Status, Chewing/Swallowing      Electronically signed by Han Collazo, MS, RD, LD on 6/12/20 at 12:53 PM EDT    Contact Number: [phone]

## 2020-06-13 LAB
ABSOLUTE EOS #: 0.15 K/UL (ref 0–0.44)
ABSOLUTE IMMATURE GRANULOCYTE: <0.03 K/UL (ref 0–0.3)
ABSOLUTE LYMPH #: 2.14 K/UL (ref 1.1–3.7)
ABSOLUTE MONO #: 0.59 K/UL (ref 0.1–1.2)
ANION GAP SERPL CALCULATED.3IONS-SCNC: 9 MMOL/L (ref 9–17)
BASOPHILS # BLD: 0 % (ref 0–2)
BASOPHILS ABSOLUTE: <0.03 K/UL (ref 0–0.2)
BUN BLDV-MCNC: 26 MG/DL (ref 6–20)
BUN/CREAT BLD: ABNORMAL (ref 9–20)
CALCIUM SERPL-MCNC: 8.5 MG/DL (ref 8.6–10.4)
CHLORIDE BLD-SCNC: 108 MMOL/L (ref 98–107)
CO2: 22 MMOL/L (ref 20–31)
CREAT SERPL-MCNC: 1.14 MG/DL (ref 0.5–0.9)
DIFFERENTIAL TYPE: NORMAL
EOSINOPHILS RELATIVE PERCENT: 2 % (ref 1–4)
GFR AFRICAN AMERICAN: 59 ML/MIN
GFR NON-AFRICAN AMERICAN: 49 ML/MIN
GFR SERPL CREATININE-BSD FRML MDRD: ABNORMAL ML/MIN/{1.73_M2}
GFR SERPL CREATININE-BSD FRML MDRD: ABNORMAL ML/MIN/{1.73_M2}
GLUCOSE BLD-MCNC: 137 MG/DL (ref 65–105)
GLUCOSE BLD-MCNC: 163 MG/DL (ref 70–99)
GLUCOSE BLD-MCNC: 173 MG/DL (ref 65–105)
GLUCOSE BLD-MCNC: 175 MG/DL (ref 65–105)
GLUCOSE BLD-MCNC: 179 MG/DL (ref 65–105)
HCT VFR BLD CALC: 42.7 % (ref 36.3–47.1)
HEMOGLOBIN: 12.9 G/DL (ref 11.9–15.1)
IMMATURE GRANULOCYTES: 0 %
LV EF: 56 %
LVEF MODALITY: NORMAL
LYMPHOCYTES # BLD: 33 % (ref 24–43)
MCH RBC QN AUTO: 29.4 PG (ref 25.2–33.5)
MCHC RBC AUTO-ENTMCNC: 30.2 G/DL (ref 28.4–34.8)
MCV RBC AUTO: 97.3 FL (ref 82.6–102.9)
MONOCYTES # BLD: 9 % (ref 3–12)
NRBC AUTOMATED: 0 PER 100 WBC
PDW BLD-RTO: 13.8 % (ref 11.8–14.4)
PLATELET # BLD: 254 K/UL (ref 138–453)
PLATELET ESTIMATE: NORMAL
PMV BLD AUTO: 10.1 FL (ref 8.1–13.5)
POTASSIUM SERPL-SCNC: 4 MMOL/L (ref 3.7–5.3)
RBC # BLD: 4.39 M/UL (ref 3.95–5.11)
RBC # BLD: NORMAL 10*6/UL
SEG NEUTROPHILS: 56 % (ref 36–65)
SEGMENTED NEUTROPHILS ABSOLUTE COUNT: 3.65 K/UL (ref 1.5–8.1)
SODIUM BLD-SCNC: 139 MMOL/L (ref 135–144)
WBC # BLD: 6.6 K/UL (ref 3.5–11.3)
WBC # BLD: NORMAL 10*3/UL

## 2020-06-13 PROCEDURE — 85025 COMPLETE CBC W/AUTO DIFF WBC: CPT

## 2020-06-13 PROCEDURE — 36415 COLL VENOUS BLD VENIPUNCTURE: CPT

## 2020-06-13 PROCEDURE — 2060000000 HC ICU INTERMEDIATE R&B

## 2020-06-13 PROCEDURE — 93306 TTE W/DOPPLER COMPLETE: CPT

## 2020-06-13 PROCEDURE — 93005 ELECTROCARDIOGRAM TRACING: CPT | Performed by: PSYCHIATRY & NEUROLOGY

## 2020-06-13 PROCEDURE — 97535 SELF CARE MNGMENT TRAINING: CPT

## 2020-06-13 PROCEDURE — 6370000000 HC RX 637 (ALT 250 FOR IP): Performed by: STUDENT IN AN ORGANIZED HEALTH CARE EDUCATION/TRAINING PROGRAM

## 2020-06-13 PROCEDURE — 80048 BASIC METABOLIC PNL TOTAL CA: CPT

## 2020-06-13 PROCEDURE — 6360000002 HC RX W HCPCS: Performed by: STUDENT IN AN ORGANIZED HEALTH CARE EDUCATION/TRAINING PROGRAM

## 2020-06-13 PROCEDURE — 6370000000 HC RX 637 (ALT 250 FOR IP): Performed by: PSYCHIATRY & NEUROLOGY

## 2020-06-13 PROCEDURE — 99232 SBSQ HOSP IP/OBS MODERATE 35: CPT | Performed by: PSYCHIATRY & NEUROLOGY

## 2020-06-13 PROCEDURE — 97110 THERAPEUTIC EXERCISES: CPT

## 2020-06-13 PROCEDURE — 97116 GAIT TRAINING THERAPY: CPT

## 2020-06-13 PROCEDURE — 82947 ASSAY GLUCOSE BLOOD QUANT: CPT

## 2020-06-13 PROCEDURE — 6360000002 HC RX W HCPCS: Performed by: NURSE PRACTITIONER

## 2020-06-13 PROCEDURE — 2580000003 HC RX 258: Performed by: STUDENT IN AN ORGANIZED HEALTH CARE EDUCATION/TRAINING PROGRAM

## 2020-06-13 PROCEDURE — 93356 MYOCRD STRAIN IMG SPCKL TRCK: CPT

## 2020-06-13 RX ORDER — TIZANIDINE 2 MG/1
2 TABLET ORAL NIGHTLY
Status: DISCONTINUED | OUTPATIENT
Start: 2020-06-13 | End: 2020-06-17 | Stop reason: HOSPADM

## 2020-06-13 RX ORDER — TIZANIDINE 4 MG/1
4 TABLET ORAL ONCE
Status: COMPLETED | OUTPATIENT
Start: 2020-06-13 | End: 2020-06-13

## 2020-06-13 RX ORDER — ATORVASTATIN CALCIUM 10 MG/1
10 TABLET, FILM COATED ORAL NIGHTLY
Status: DISCONTINUED | OUTPATIENT
Start: 2020-06-13 | End: 2020-06-17 | Stop reason: HOSPADM

## 2020-06-13 RX ADMIN — TIZANIDINE 2 MG: 2 TABLET ORAL at 21:04

## 2020-06-13 RX ADMIN — ACETAMINOPHEN 650 MG: 325 TABLET ORAL at 04:48

## 2020-06-13 RX ADMIN — TIZANIDINE 4 MG: 4 TABLET ORAL at 09:24

## 2020-06-13 RX ADMIN — SODIUM CHLORIDE, PRESERVATIVE FREE 10 ML: 5 INJECTION INTRAVENOUS at 21:05

## 2020-06-13 RX ADMIN — INSULIN LISPRO 1 UNITS: 100 INJECTION, SOLUTION INTRAVENOUS; SUBCUTANEOUS at 21:21

## 2020-06-13 RX ADMIN — ATORVASTATIN CALCIUM 10 MG: 10 TABLET, FILM COATED ORAL at 22:58

## 2020-06-13 RX ADMIN — ENOXAPARIN SODIUM 30 MG: 30 INJECTION SUBCUTANEOUS at 21:04

## 2020-06-13 RX ADMIN — ENOXAPARIN SODIUM 30 MG: 30 INJECTION SUBCUTANEOUS at 08:22

## 2020-06-13 RX ADMIN — HYDRALAZINE HYDROCHLORIDE 10 MG: 20 INJECTION INTRAMUSCULAR; INTRAVENOUS at 21:39

## 2020-06-13 RX ADMIN — SODIUM CHLORIDE, PRESERVATIVE FREE 10 ML: 5 INJECTION INTRAVENOUS at 08:31

## 2020-06-13 RX ADMIN — INSULIN GLARGINE 5 UNITS: 100 INJECTION, SOLUTION SUBCUTANEOUS at 21:21

## 2020-06-13 RX ADMIN — AMLODIPINE BESYLATE 10 MG: 10 TABLET ORAL at 08:21

## 2020-06-13 RX ADMIN — INSULIN LISPRO 2 UNITS: 100 INJECTION, SOLUTION INTRAVENOUS; SUBCUTANEOUS at 12:48

## 2020-06-13 RX ADMIN — INSULIN LISPRO 2 UNITS: 100 INJECTION, SOLUTION INTRAVENOUS; SUBCUTANEOUS at 08:27

## 2020-06-13 ASSESSMENT — PAIN DESCRIPTION - ORIENTATION: ORIENTATION: ANTERIOR

## 2020-06-13 ASSESSMENT — PAIN DESCRIPTION - DESCRIPTORS
DESCRIPTORS: ACHING
DESCRIPTORS: DISCOMFORT;HEADACHE;DULL

## 2020-06-13 ASSESSMENT — PAIN SCALES - GENERAL
PAINLEVEL_OUTOF10: 2
PAINLEVEL_OUTOF10: 0
PAINLEVEL_OUTOF10: 0
PAINLEVEL_OUTOF10: 8
PAINLEVEL_OUTOF10: 2

## 2020-06-13 ASSESSMENT — PAIN DESCRIPTION - PROGRESSION
CLINICAL_PROGRESSION: NOT CHANGED

## 2020-06-13 ASSESSMENT — PAIN DESCRIPTION - PAIN TYPE
TYPE: ACUTE PAIN
TYPE: ACUTE PAIN

## 2020-06-13 ASSESSMENT — PAIN - FUNCTIONAL ASSESSMENT: PAIN_FUNCTIONAL_ASSESSMENT: ACTIVITIES ARE NOT PREVENTED

## 2020-06-13 ASSESSMENT — PAIN DESCRIPTION - LOCATION
LOCATION: BACK
LOCATION: HEAD

## 2020-06-13 ASSESSMENT — PAIN DESCRIPTION - ONSET: ONSET: ON-GOING

## 2020-06-13 ASSESSMENT — PAIN DESCRIPTION - FREQUENCY
FREQUENCY: INTERMITTENT
FREQUENCY: INTERMITTENT

## 2020-06-13 NOTE — PROGRESS NOTES
Occupational Therapy  Facility/Department: [address]  Daily Treatment Note  NAME: Inderjit RonquilloDOB: 1960  MRN: [id number]    Date of Service: 6/13/2020    Discharge Recommendations: Further therapy recommended at discharge. The patient should be able to tolerate at least three hours of therapy per day over 5 days or 15 hours over 7 days. Assessment   Performance deficits / Impairments: Decreased functional mobility ; Decreased endurance;Decreased strength;Decreased ROM; Decreased ADL status; Decreased high-level IADLs;Decreased balance;Decreased coordination;Decreased fine motor control;Decreased safe awareness  Assessment: Pt lives alone and would be unsafe to return home at this time. Prognosis: Good  Patient Education: OT POC, transfer/walker safety, importance of participation in therapy, using affected RUE in functional activities, benefits of IP rehab - pt verbalized understanding. Activity Tolerance  Activity Tolerance: Patient Tolerated treatment well  Safety Devices  Safety Devices in place: Yes  Type of devices: Call light within reach; Chair alarm in place; Patient at risk for falls; Left in chair;Nurse notified         Patient Diagnosis(es): The encounter diagnosis was Intracranial hemorrhage ([facility] [address] has a past medical history of Chronic headache, Chronic headache, Elevated liver enzymes, Esophageal reflux, Exposure to hepatitis C, Hepatitis C, Hypertension, Major depression, and Major depression. has no past surgical history on file. Restrictions  Restrictions/Precautions  Restrictions/Precautions: General Precautions, Fall Risk  Required Braces or Orthoses?: No  Position Activity Restriction  Other position/activity restrictions:  Up with assistance   Subjective   General  Patient assessed for rehabilitation services?: Yes  Family / Caregiver Present: No  Vital Signs  Patient Currently in Pain: Denies   Orientation  Orientation  Overall Orientation Status: Within Functional

## 2020-06-13 NOTE — PROGRESS NOTES
Physical Therapy  Facility/Department: [address]  Daily Treatment Note  NAME: Seth Tyler  : 1960  MRN: [id number]    Date of Service: 2020    Discharge Recommendations:  Patient would benefit from continued therapy after discharge   PT Equipment Recommendations  Equipment Needed: (TBD)    Assessment   Body structures, Functions, Activity limitations: Decreased functional mobility ; Decreased strength;Decreased endurance;Decreased balance;Decreased high-level IADLs;Decreased fine motor control;Decreased vision/visual deficit  Prognosis: Good  Decision Making: Medium Complexity  PT Education: Goals;Transfer Training;Gait Training;Functional Mobility Training;General Safety  Barriers to Learning: none  REQUIRES PT FOLLOW UP: Yes  Activity Tolerance  Activity Tolerance: Patient Tolerated treatment well     Patient Diagnosis(es): The encounter diagnosis was Intracranial hemorrhage ([facility] [address] has a past medical history of Chronic headache, Chronic headache, Elevated liver enzymes, Esophageal reflux, Exposure to hepatitis C, Hepatitis C, Hypertension, Major depression, and Major depression. has no past surgical history on file. Restrictions  Restrictions/Precautions  Restrictions/Precautions: General Precautions, Fall Risk  Required Braces or Orthoses?: No  Position Activity Restriction  Other position/activity restrictions: Up with assistance   Subjective   General  Response To Previous Treatment: Patient with no complaints from previous session.   Family / Caregiver Present: No  Subjective  Subjective: RN and pt agreed to PT, pt awake sitting EOB with OT upon arrival  Pain Screening  Patient Currently in Pain: Denies  Vital Signs  Patient Currently in Pain: Denies       Orientation  Orientation  Overall Orientation Status: Within Normal Limits       Objective      Transfers  Sit to Stand: Contact guard assistance  Stand to sit: Contact guard assistance  Comment: vc's for hand placement  Ambulation  Ambulation?: Yes  Ambulation 1  Surface: level tile  Device: Rolling walker   Assistance:Contact guard assistance/ Minimal assistance  Quality of Gait: wide NICK, small steps; mild unsteadiness but no significant LOB   Gait Deviations: Slow Dulce;Decreased step length;Shuffles;Decreased head and trunk rotation  Distance: 30ft, 70ft x 2   Stairs/Curb  Stairs?: No        Other exercises  Seated LE exercise program: Long Arc Quads, hip abduction/adduction, heel/toe raises, and marches.  Reps: x 20                Goals  Short term goals  Time Frame for Short term goals: 12 visits  Short term goal 1: independent bed mobility   Short term goal 2: independent transfers  Short term goal 3: independent gait with appropriate device x 100'  Short term goal 4: independent stair ambulation x 3 steps with 1 HR   Patient Goals   Patient goals : get better    Plan    Plan  Times per week: 6-12 visits weekly  Times per day: Daily  Current Treatment Recommendations: Strengthening, ROM, Balance Training, Functional Mobility Training, Transfer Training, Endurance Training, Gait Training, Stair training, Neuromuscular Re-education, Safety Education & Training, Home Exercise Program, Patient/Caregiver Education & Training  Safety Devices  Type of devices: Call light within reach, Gait belt, Patient at risk for falls, Nurse notified, Left in chair, Chair alarm in place  Restraints  Initially in place: No     Therapy Time   Individual Concurrent Group Co-treatment   Time In 1545         Time Out 1610         Minutes 25           Time coded minutes SAKINA Roche

## 2020-06-13 NOTE — PROGRESS NOTES
Soft, no rigidity   NEUROLOGIC:  Mental Status:  A & O x3,awake             Cranial Nerves:    cranial nerves II-XII are grossly intact    Motor Exam:    Drift:  absent  Tone:  normal                RUE 4+/5               RLE 4+/5     Sensory:    Touch:    Right Upper Extremity:  normal  Left Upper Extremity:  normal  Right Lower Extremity:  normal  Left Lower Extremity:  normal    Deep Tendon Reflexes:    Right Bicep:  2+  Left Bicep:  2+  Right Knee:  2+  Left Knee:  2+    Plantar Response:  Right:  downgoing  Left:  downgoing    Clonus:  N/A  Land's:  N/A    Coordination/Dysmetria:  Heel to Shin:  Right:  normal  Finger to Nose:   Right:  normal   Dysdiadochokinesia:  absent    Gait:  normal   NIH Stroke Scale Total (if not done complete detailed one below):    1a.  Level of consciousness:  0 - alert; keenly responsive  1b. Level of consciousness questions:  0 - answers both questions correctly  1c. Level of consciousness questions:  0 - performs both tasks correctly  2. Best Gaze:  0 - normal  3. Visual:  0 - no visual loss  4. Facial Palsy:  0 - normal symmetric movement  5a. Motor left arm:  0 - no drift, limb holds 90 (or 45) degrees for full 10 seconds  5b. Motor right arm:  0 - no drift, limb holds 90 (or 45) degrees for full 10 seconds  6a. Motor left le - no drift; leg holds 30 degree position for full 5 seconds  6b. Motor right le - no drift; leg holds 30 degree position for full 5 seconds  7. Limb Ataxia:  0 - absent  8. Sensory:  0 - normal; no sensory loss  9. Best Language:  1 - mild to moderate aphasia; some obvious loss of fluency or facility of comprehension without significant limitation on ideas expressed or form of expression. Reduction of speech and/or comprehension, however, makes conversation about provided materials difficult or impossible.   For example, in conversation about provided materials, examiner can identify picture or naming card content from

## 2020-06-13 NOTE — PROGRESS NOTES
Occupational Therapy   Occupational Therapy Initial Assessment  Date: 2020   Patient Name: Ashley Cantu  MRN: [id number]     : 1960    Date of Service: 2020    Discharge Recommendations:    Further therapy recommended at discharge. The patient should be able to tolerate at least three hours of therapy per day over 5 days or 15 hours over 7 days. Assessment   Performance deficits / Impairments: Decreased functional mobility ; Decreased endurance;Decreased strength;Decreased ROM; Decreased ADL status; Decreased high-level IADLs;Decreased balance;Decreased coordination;Decreased fine motor control  Assessment: Pt to benefit from continued therapy at discharge to address the above deficits. Prognosis: Good  OT Education: OT Role;Plan of Care;Equipment  REQUIRES OT FOLLOW UP: Yes           Patient Diagnosis(es): The encounter [facility] was [facility] has a past medical history of Chronic headache, Chronic headache, Elevated liver enzymes, Esophageal reflux, Exposure to hepatitis C, Hepatitis C, Hypertension, Major depression, and Major depression. has no past surgical history on file. Restrictions  Restrictions/Precautions  Restrictions/Precautions: General Precautions, Fall Risk  Required Braces or Orthoses?: No  Position Activity Restriction  Other position/activity restrictions:  Up with assistance     Subjective   General  Patient assessed for rehabilitation services?: Yes  Family / Caregiver Present: No    Social/Functional History  Social/Functional History  Lives With: Alone  Type of Home: ([facility])  Home Layout: One level  Home Access: Stairs to enter without rails  Entrance Stairs - Number of Steps: 3  Bathroom Shower/Tub: Tub/Shower unit  Bathroom Toilet: Standard  ADL Assistance: Independent  Homemaking Assistance: Independent  Homemaking Responsibilities: Yes  Ambulation Assistance: Independent  Transfer Assistance: Independent  Active : Yes  Mode of Transportation: Car  Occupation: On disability  Additional Comments: Pt reports may be able to stay with son       Objective   Orientation  Overall Orientation Status: Within Functional Limits     Balance  Sitting Balance: Contact guard assistance  Standing Balance: Minimal assistance  Standing Balance  Time: ~3 minutes  Activity: static standing at chair, weight shifting, unsteady  Functional Mobility  Functional - Mobility Device: No device  Activity: Other  Assist Level: Moderate assistance  Functional Mobility Comments: pt with buckling  ADL  Feeding: Modified independent ;Setup  Grooming: Setup;Modified independent (washing face with provided cloth- primarily using L hand (R hand at baseline)  UE Bathing: Moderate assistance  LE Bathing: Moderate assistance  UE Dressing: Moderate assistance  LE Dressing: Moderate assistance  Toileting: Moderate assistance        Bed mobility  Comment: Pt up in chair upon arrival and exit  Transfers  Sit to stand: Contact guard assistance  Stand to sit: Contact guard assistance         LUE AROM (degrees)  LUE AROM : WFL  Left Hand AROM (degrees)  Left Hand AROM: WFL  RUE PROM (degrees)  RUE PROM: WFL  RUE AROM (degrees)  RUE General AROM: 0-90 at shoulder, WFL   Right Hand PROM (degrees)  Right Hand PROM: WFL  LUE Strength  Gross LUE Strength: WFL  RUE Strength  Gross RUE Strength: Exceptions to [facility]  R Shoulder Flex: 3-/5  R Elbow Flex: 3/5  R Hand General: 3/5         Plan   Plan  Times per week: 4-5x    AM-PAC Score   AM-PAC Inpatient Daily Activity Raw Score: 15 (06/12/20 1210)  AM-PAC Inpatient ADL T-Scale Score : 34.69 (06/12/20 1210)  ADL Inpatient CMS 0-100% Score: 56.46 (06/12/20 1210)  ADL Inpatient CMS G-Code Modifier : CK (06/12/20 1210)    Goals  Short term goals  Time Frame for Short term goals: by discharge pt will. Shellia Combe term goal 1: demo functional transfers with CGA  Short term goal 2: demo 5+ minutes standing tolerance to engage in functional tasks with SBA  Short term goal 3: demo UB ADL/feeding task with mod I and set up  Short term goal 4: demo LB ADL/toileting task with min A  Short term goal 5: demo functional mobility with use of AD prn and min A       Therapy Time   Individual Concurrent Group Co-treatment   Time In 1003         Time Out 1033         Minutes 30         Timed Code Treatment Minutes: [address] [facility], OT

## 2020-06-14 LAB
ABSOLUTE EOS #: 0.16 K/UL (ref 0–0.44)
ABSOLUTE IMMATURE GRANULOCYTE: <0.03 K/UL (ref 0–0.3)
ABSOLUTE LYMPH #: 1.61 K/UL (ref 1.1–3.7)
ABSOLUTE MONO #: 0.61 K/UL (ref 0.1–1.2)
ANION GAP SERPL CALCULATED.3IONS-SCNC: 10 MMOL/L (ref 9–17)
BASOPHILS # BLD: 0 % (ref 0–2)
BASOPHILS ABSOLUTE: <0.03 K/UL (ref 0–0.2)
BUN BLDV-MCNC: 25 MG/DL (ref 6–20)
BUN/CREAT BLD: ABNORMAL (ref 9–20)
CALCIUM SERPL-MCNC: 8.9 MG/DL (ref 8.6–10.4)
CHLORIDE BLD-SCNC: 107 MMOL/L (ref 98–107)
CO2: 24 MMOL/L (ref 20–31)
CREAT SERPL-MCNC: 1.05 MG/DL (ref 0.5–0.9)
DIFFERENTIAL TYPE: NORMAL
EOSINOPHILS RELATIVE PERCENT: 3 % (ref 1–4)
GFR AFRICAN AMERICAN: >60 ML/MIN
GFR NON-AFRICAN AMERICAN: 54 ML/MIN
GFR SERPL CREATININE-BSD FRML MDRD: ABNORMAL ML/MIN/{1.73_M2}
GFR SERPL CREATININE-BSD FRML MDRD: ABNORMAL ML/MIN/{1.73_M2}
GLUCOSE BLD-MCNC: 139 MG/DL (ref 65–105)
GLUCOSE BLD-MCNC: 163 MG/DL (ref 70–99)
GLUCOSE BLD-MCNC: 191 MG/DL (ref 65–105)
GLUCOSE BLD-MCNC: 199 MG/DL (ref 65–105)
GLUCOSE BLD-MCNC: 200 MG/DL (ref 65–105)
HCT VFR BLD CALC: 42.5 % (ref 36.3–47.1)
HEMOGLOBIN: 13.2 G/DL (ref 11.9–15.1)
IMMATURE GRANULOCYTES: 0 %
LYMPHOCYTES # BLD: 26 % (ref 24–43)
MCH RBC QN AUTO: 29.4 PG (ref 25.2–33.5)
MCHC RBC AUTO-ENTMCNC: 31.1 G/DL (ref 28.4–34.8)
MCV RBC AUTO: 94.7 FL (ref 82.6–102.9)
MONOCYTES # BLD: 10 % (ref 3–12)
NRBC AUTOMATED: 0 PER 100 WBC
PDW BLD-RTO: 13.3 % (ref 11.8–14.4)
PLATELET # BLD: 249 K/UL (ref 138–453)
PLATELET ESTIMATE: NORMAL
PMV BLD AUTO: 10.3 FL (ref 8.1–13.5)
POTASSIUM SERPL-SCNC: 4.1 MMOL/L (ref 3.7–5.3)
RBC # BLD: 4.49 M/UL (ref 3.95–5.11)
RBC # BLD: NORMAL 10*6/UL
SEG NEUTROPHILS: 61 % (ref 36–65)
SEGMENTED NEUTROPHILS ABSOLUTE COUNT: 3.79 K/UL (ref 1.5–8.1)
SODIUM BLD-SCNC: 141 MMOL/L (ref 135–144)
WBC # BLD: 6.2 K/UL (ref 3.5–11.3)
WBC # BLD: NORMAL 10*3/UL

## 2020-06-14 PROCEDURE — 6360000002 HC RX W HCPCS: Performed by: STUDENT IN AN ORGANIZED HEALTH CARE EDUCATION/TRAINING PROGRAM

## 2020-06-14 PROCEDURE — 6370000000 HC RX 637 (ALT 250 FOR IP): Performed by: STUDENT IN AN ORGANIZED HEALTH CARE EDUCATION/TRAINING PROGRAM

## 2020-06-14 PROCEDURE — 6370000000 HC RX 637 (ALT 250 FOR IP): Performed by: PSYCHIATRY & NEUROLOGY

## 2020-06-14 PROCEDURE — 36415 COLL VENOUS BLD VENIPUNCTURE: CPT

## 2020-06-14 PROCEDURE — 2580000003 HC RX 258: Performed by: STUDENT IN AN ORGANIZED HEALTH CARE EDUCATION/TRAINING PROGRAM

## 2020-06-14 PROCEDURE — 85025 COMPLETE CBC W/AUTO DIFF WBC: CPT

## 2020-06-14 PROCEDURE — 2500000003 HC RX 250 WO HCPCS: Performed by: STUDENT IN AN ORGANIZED HEALTH CARE EDUCATION/TRAINING PROGRAM

## 2020-06-14 PROCEDURE — 99232 SBSQ HOSP IP/OBS MODERATE 35: CPT | Performed by: PSYCHIATRY & NEUROLOGY

## 2020-06-14 PROCEDURE — 82947 ASSAY GLUCOSE BLOOD QUANT: CPT

## 2020-06-14 PROCEDURE — 2060000000 HC ICU INTERMEDIATE R&B

## 2020-06-14 PROCEDURE — 80048 BASIC METABOLIC PNL TOTAL CA: CPT

## 2020-06-14 PROCEDURE — 6360000002 HC RX W HCPCS: Performed by: NURSE PRACTITIONER

## 2020-06-14 RX ORDER — LISINOPRIL 5 MG/1
5 TABLET ORAL DAILY
Status: DISCONTINUED | OUTPATIENT
Start: 2020-06-14 | End: 2020-06-15

## 2020-06-14 RX ORDER — LABETALOL 100 MG/1
50 TABLET, FILM COATED ORAL EVERY 8 HOURS SCHEDULED
Status: DISCONTINUED | OUTPATIENT
Start: 2020-06-14 | End: 2020-06-16

## 2020-06-14 RX ADMIN — INSULIN LISPRO 4 UNITS: 100 INJECTION, SOLUTION INTRAVENOUS; SUBCUTANEOUS at 13:00

## 2020-06-14 RX ADMIN — LABETALOL HCL 50 MG: 100 TABLET, FILM COATED ORAL at 14:04

## 2020-06-14 RX ADMIN — ENOXAPARIN SODIUM 30 MG: 30 INJECTION SUBCUTANEOUS at 08:47

## 2020-06-14 RX ADMIN — INSULIN LISPRO 1 UNITS: 100 INJECTION, SOLUTION INTRAVENOUS; SUBCUTANEOUS at 21:39

## 2020-06-14 RX ADMIN — Medication 10 MG: at 08:46

## 2020-06-14 RX ADMIN — TIZANIDINE 2 MG: 2 TABLET ORAL at 21:40

## 2020-06-14 RX ADMIN — INSULIN LISPRO 2 UNITS: 100 INJECTION, SOLUTION INTRAVENOUS; SUBCUTANEOUS at 17:46

## 2020-06-14 RX ADMIN — SODIUM CHLORIDE, PRESERVATIVE FREE 10 ML: 5 INJECTION INTRAVENOUS at 10:31

## 2020-06-14 RX ADMIN — HYDRALAZINE HYDROCHLORIDE 10 MG: 20 INJECTION INTRAMUSCULAR; INTRAVENOUS at 04:05

## 2020-06-14 RX ADMIN — ENOXAPARIN SODIUM 30 MG: 30 INJECTION SUBCUTANEOUS at 21:40

## 2020-06-14 RX ADMIN — ATORVASTATIN CALCIUM 10 MG: 10 TABLET, FILM COATED ORAL at 21:40

## 2020-06-14 RX ADMIN — INSULIN GLARGINE 5 UNITS: 100 INJECTION, SOLUTION SUBCUTANEOUS at 21:39

## 2020-06-14 RX ADMIN — HYDRALAZINE HYDROCHLORIDE 10 MG: 20 INJECTION INTRAMUSCULAR; INTRAVENOUS at 10:31

## 2020-06-14 RX ADMIN — HYDRALAZINE HYDROCHLORIDE 10 MG: 20 INJECTION INTRAMUSCULAR; INTRAVENOUS at 21:39

## 2020-06-14 RX ADMIN — ACETAMINOPHEN 650 MG: 325 TABLET ORAL at 21:38

## 2020-06-14 RX ADMIN — ACETAMINOPHEN 650 MG: 325 TABLET ORAL at 04:04

## 2020-06-14 RX ADMIN — AMLODIPINE BESYLATE 10 MG: 10 TABLET ORAL at 08:47

## 2020-06-14 RX ADMIN — LABETALOL HCL 50 MG: 100 TABLET, FILM COATED ORAL at 21:42

## 2020-06-14 RX ADMIN — LISINOPRIL 5 MG: 5 TABLET ORAL at 08:46

## 2020-06-14 RX ADMIN — SODIUM CHLORIDE, PRESERVATIVE FREE 10 ML: 5 INJECTION INTRAVENOUS at 21:53

## 2020-06-14 RX ADMIN — Medication 10 MG: at 05:09

## 2020-06-14 ASSESSMENT — PAIN SCALES - GENERAL
PAINLEVEL_OUTOF10: 7
PAINLEVEL_OUTOF10: 0
PAINLEVEL_OUTOF10: 8
PAINLEVEL_OUTOF10: 6

## 2020-06-14 ASSESSMENT — PAIN DESCRIPTION - ORIENTATION: ORIENTATION: RIGHT

## 2020-06-14 ASSESSMENT — PAIN DESCRIPTION - LOCATION: LOCATION: LEG

## 2020-06-14 ASSESSMENT — PAIN DESCRIPTION - PAIN TYPE: TYPE: ACUTE PAIN

## 2020-06-14 NOTE — PROGRESS NOTES
INTAKE/OUTPUT:    Intake/Output Summary (Last 24 hours) at 6/14/2020 1529  Last data filed at 6/14/2020 0946  Gross per 24 hour   Intake 560 ml   Output 3300 ml   Net -2740 ml       IMAGING:   Place summary of recent imaging here. Head CT: 6/12/2020  Impression   Stable intraparenchymal hemorrhage within the ventral aspect of the linda.        CT head 6/11/2020:  Acute hemorrhage ventral linda.     MRI brain      Acute to early subacute intraparenchymal hematoma within the linda.               Labs and Images reviewed with:  [] Dr. Juan Miguel Lewis. Gui    [x] Dr. Nick Rushing  [] Dr. Ines Shine  [] There are no new interval images to review. 2D echo 6/13/2020- FINDINGS  Left Atrium  Left atrium is moderately dilated. Left Ventricle  Left ventricle is normal in size with normal systolic function globally. Calculated ejection fraction is 56%. Mild concentric left ventricular hypertrophy. Grade I (mild) left ventricular diastolic dysfunction. Right Atrium  Right atrium is normal in size. Right Ventricle  Normal right ventricular size and function. Mitral Valve  Mild thickening of the mitral leaflets without stenosis. Mild mitral regurgitation. Aortic Valve  Aortic valve is trileaflet. Aortic sclerosis without stenosis. Mild aortic insufficiency. Tricuspid Valve  Tricuspid valve structure is normal.  Mild tricuspid regurgitation. Estimated right ventricular systolic pressure is 36 mmHg. Pulmonic Valve  The pulmonic valve is normal in structure. PHYSICAL EXAM        General Examination    General Resting comfortably in bed   Head Normocephalic, without obvious abnormality   Neck Supple, symmetrical. Good ROM. No midline or paraspinal tenderness. Lungs Respirations unlabored, no wheezing   Chest Wall No deformity   Heart RRR, no murmur   Abdomen Soft. Non-tender, non-distended   Extremities No cyanosis or edema or warmth.    Pulses 2+ and symmetric   Skin: Skin  turgor normal, no rashes or lesions d/c, or of IPR for close compliance and adjustment of BP, T2DM, PRATIK w/o.   - F/u neurology in 6-8 weeks post-discharge    PROPHYLAXIS:  Stress ulcer: not indicated  DVT PROPHYLAXIS:SCD sleeves - Thigh High. -Lovenox ppx     DISPOSITION:  [] To remain ICU:   [x] OK for out of ICU from Neuro Critical Care standpoint, and d/c tomorrow    We will continue to follow along. For any changes in exam or patient status please contact Neuro Critical Care.       Aashish Rivas DO  Neuro Critical Care  Pager [phone]  6/14/2020     3:29 PM

## 2020-06-14 NOTE — PROGRESS NOTES
Report called to [facility], Lavone Morning. Patient being transferred in to room 545. All questions and concerns addressed. Writer will transferred by Lavern Miramontes. Will continue to monitor.

## 2020-06-14 NOTE — PROGRESS NOTES
Physical Medicine & Rehabilitation  F/u Chart review note    6/14/2020 9:42 AM     CC: Ambulatory and ADL dysfunction due to     Brief history    79-year-old male admitted 6/11/2020 with history of hypertension, diabetes admitted with headache slurred speech and difficulty walking-diagnosed with right ventral pontine bleed on CT. Course complicated by hypotension, insomnia. Per neurology-patient notes she would like outpatient PT/OT and will be staying with her son. Rehabilitation:   PT: 6/13   Restrictions/Precautions: General Precautions, Fall Risk  Other position/activity restrictions: Up with assistance      Transfers  Sit to Stand: Contact guard assistance  Stand to sit: Contact guard assistance  Comment: vc's for hand placement  Ambulation  Ambulation?: Yes  Ambulation 1  Surface: level tile  Device: Rolling walker   Assistance:Contact guard assistance/ Minimal assistance  Quality of Gait: wide NICK, small steps; mild unsteadiness but no significant LOB   Gait Deviations: Slow Dulce;Decreased step length;Shuffles;Decreased head and trunk rotation  Distance: 30ft, 70ft x2      OT:  6 /13  ADL  Grooming: Setup;Stand by assistance(Denture care standing at sink.)  Additional Comments: Per RN, pt completed shower earlier this day. Pt completed denture care standing at sink. Pt R hand swollen. Pt able to use R hand during functional activities with some difficulty with FM tasks (holding onto toothbrush, putting cap on toothpaste). Pt is R handed. ADL  Feeding: Modified independent , Setup  Grooming: Setup, Stand by assistance(Denture care standing at sink.)  UE Bathing: Moderate assistance  LE Bathing: Moderate assistance  UE Dressing: Moderate assistance  LE Dressing: Moderate assistance  Toileting: Moderate assistance  Additional Comments: Per RN, pt completed shower earlier this day. Pt completed denture care standing at sink. Pt R hand swollen.  Pt able to use R hand during functional activities with Oral Daily     Continuous Infusions:   dextrose       PRN Meds:.calcium carbonate, sodium chloride flush, acetaminophen, promethazine **OR** ondansetron, labetalol, glucose, dextrose, glucagon (rDNA), dextrose, hydrALAZINE     Diagnostics:     CBC:   Recent Labs     06/12/20  0534 06/13/20  0435 06/14/20  0435   WBC 7.0 6.6 6.2   RBC 4.46 4.39 4.49   HGB 13.2 12.9 13.2   HCT 40.3 42.7 42.5   MCV 90.4 97.3 94.7   RDW 13.5 13.8 13.3    254 249     BMP:   Recent Labs     06/12/20  0534 06/13/20  0435 06/14/20  0435    139 141   K 4.0 4.0 4.1    108* 107   CO2 20 22 24   PHOS 3.5  --   --    BUN 29* 26* 25*   CREATININE 1.43* 1.14* 1.05*     BNP: No results for input(s): BNP in the last 72 hours. PT/INR: No results for input(s): PROTIME, INR in the last 72 hours. APTT: No results for input(s): APTT in the last 72 hours. CARDIAC ENZYMES:   Recent Labs     06/12/20  0200 06/12/20  0948   TROPONINT NOT REPORTED NOT REPORTED     FASTING LIPID PANEL:  Lab Results   Component Value Date    CHOL 163 06/12/2020    HDL 33 (L) 06/12/2020    TRIG 277 (H) 06/12/2020     LIVER PROFILE: No results for input(s): AST, ALT, ALB, BILIDIR, BILITOT, ALKPHOS in the last 72 hours. I/O (24Hr): Intake/Output Summary (Last 24 hours) at 6/14/2020 0942  Last data filed at 6/14/2020 0438  Gross per 24 hour   Intake 560 ml   Output 2600 ml   Net -2040 ml       Glu last 24 hour  Recent Labs     06/13/20  1227 06/13/20  1729 06/13/20  2117 06/14/20  0751   POCGLU 179* 137* 173* 139*       No results for input(s): CLARITYU, COLORU, PHUR, SPECGRAV, PROTEINU, RBCUA, BLOODU, BACTERIA, NITRU, WBCUA, LEUKOCYTESUR, YEAST, GLUCOSEU, BILIRUBINUR in the last 72 hours. Impression: Ms. Yvonne Monge is a 64 y.o. male with a history of <principal problem not specified>     1. Intraparenchymal hemorrhage  2. Exposure hepatitis C/elevated liver enzymes  3. Hypertension hyperlipidemia-Norvasc, Lipitor, senna pro  4.  History of

## 2020-06-14 NOTE — PLAN OF CARE
Problem: Falls - Risk of:  Goal: Will remain free from falls  Description: Will remain free from falls  Outcome: Ongoing  Goal: Absence of physical injury  Description: Absence of physical injury  Outcome: Ongoing     Problem: Pain:  Goal: Pain level will decrease  Description: Pain level will decrease  Outcome: Ongoing  Goal: Control of acute pain  Description: Control of acute pain  Outcome: Ongoing  Goal: Control of chronic pain  Description: Control of chronic pain  Outcome: Ongoing     Problem: Neurological  Goal: Maximum potential motor/sensory/cognitive function  Outcome: Ongoing     Problem: HEMODYNAMIC STATUS  Goal: Patient has stable vital signs and fluid balance  Outcome: Ongoing     Problem: ACTIVITY INTOLERANCE/IMPAIRED MOBILITY  Goal: Mobility/activity is maintained at optimum level for patient  Outcome: Ongoing     Problem: COMMUNICATION IMPAIRMENT  Goal: Ability to express needs and understand communication  Outcome: Ongoing     Problem: Nutrition  Goal: Optimal nutrition therapy  Description: Nutrition Problem: Increased nutrient needs  Intervention: Food and/or Nutrient Delivery: Continue current diet, Start ONS  Nutritional Goals: Meet greater than 50% of estimated nutrient needs with intake of therapeutic diet.    Outcome: Ongoing  Electronically signed by Parminder Roland RN on 6/14/2020 at 4:36 PM

## 2020-06-15 LAB
ABSOLUTE EOS #: 0.14 K/UL (ref 0–0.44)
ABSOLUTE IMMATURE GRANULOCYTE: <0.03 K/UL (ref 0–0.3)
ABSOLUTE LYMPH #: 1.53 K/UL (ref 1.1–3.7)
ABSOLUTE MONO #: 0.54 K/UL (ref 0.1–1.2)
ANION GAP SERPL CALCULATED.3IONS-SCNC: 12 MMOL/L (ref 9–17)
BASOPHILS # BLD: 0 % (ref 0–2)
BASOPHILS ABSOLUTE: <0.03 K/UL (ref 0–0.2)
BUN BLDV-MCNC: 31 MG/DL (ref 6–20)
BUN/CREAT BLD: ABNORMAL (ref 9–20)
CALCIUM SERPL-MCNC: 9.1 MG/DL (ref 8.6–10.4)
CHLORIDE BLD-SCNC: 106 MMOL/L (ref 98–107)
CO2: 23 MMOL/L (ref 20–31)
CREAT SERPL-MCNC: 1.18 MG/DL (ref 0.5–0.9)
DIFFERENTIAL TYPE: NORMAL
EKG ATRIAL RATE: 53 BPM
EKG P AXIS: 31 DEGREES
EKG P-R INTERVAL: 194 MS
EKG Q-T INTERVAL: 434 MS
EKG QRS DURATION: 110 MS
EKG QTC CALCULATION (BAZETT): 407 MS
EKG R AXIS: 22 DEGREES
EKG T AXIS: 61 DEGREES
EKG VENTRICULAR RATE: 53 BPM
EOSINOPHILS RELATIVE PERCENT: 3 % (ref 1–4)
GFR AFRICAN AMERICAN: 57 ML/MIN
GFR NON-AFRICAN AMERICAN: 47 ML/MIN
GFR SERPL CREATININE-BSD FRML MDRD: ABNORMAL ML/MIN/{1.73_M2}
GFR SERPL CREATININE-BSD FRML MDRD: ABNORMAL ML/MIN/{1.73_M2}
GLUCOSE BLD-MCNC: 177 MG/DL (ref 70–99)
GLUCOSE BLD-MCNC: 189 MG/DL (ref 65–105)
GLUCOSE BLD-MCNC: 216 MG/DL (ref 65–105)
GLUCOSE BLD-MCNC: 226 MG/DL (ref 65–105)
GLUCOSE BLD-MCNC: 287 MG/DL (ref 65–105)
HCT VFR BLD CALC: 42.5 % (ref 36.3–47.1)
HEMOGLOBIN: 13.3 G/DL (ref 11.9–15.1)
IMMATURE GRANULOCYTES: 0 %
LYMPHOCYTES # BLD: 27 % (ref 24–43)
MCH RBC QN AUTO: 29.6 PG (ref 25.2–33.5)
MCHC RBC AUTO-ENTMCNC: 31.3 G/DL (ref 28.4–34.8)
MCV RBC AUTO: 94.4 FL (ref 82.6–102.9)
MONOCYTES # BLD: 10 % (ref 3–12)
NRBC AUTOMATED: 0 PER 100 WBC
PDW BLD-RTO: 13.4 % (ref 11.8–14.4)
PLATELET # BLD: 250 K/UL (ref 138–453)
PLATELET ESTIMATE: NORMAL
PMV BLD AUTO: 10 FL (ref 8.1–13.5)
POTASSIUM SERPL-SCNC: 4.1 MMOL/L (ref 3.7–5.3)
RBC # BLD: 4.5 M/UL (ref 3.95–5.11)
RBC # BLD: NORMAL 10*6/UL
SEG NEUTROPHILS: 60 % (ref 36–65)
SEGMENTED NEUTROPHILS ABSOLUTE COUNT: 3.35 K/UL (ref 1.5–8.1)
SODIUM BLD-SCNC: 141 MMOL/L (ref 135–144)
WBC # BLD: 5.6 K/UL (ref 3.5–11.3)
WBC # BLD: NORMAL 10*3/UL

## 2020-06-15 PROCEDURE — 6370000000 HC RX 637 (ALT 250 FOR IP): Performed by: STUDENT IN AN ORGANIZED HEALTH CARE EDUCATION/TRAINING PROGRAM

## 2020-06-15 PROCEDURE — 6370000000 HC RX 637 (ALT 250 FOR IP): Performed by: PSYCHIATRY & NEUROLOGY

## 2020-06-15 PROCEDURE — 6360000002 HC RX W HCPCS: Performed by: NURSE PRACTITIONER

## 2020-06-15 PROCEDURE — 99212 OFFICE O/P EST SF 10 MIN: CPT

## 2020-06-15 PROCEDURE — 99233 SBSQ HOSP IP/OBS HIGH 50: CPT | Performed by: PSYCHIATRY & NEUROLOGY

## 2020-06-15 PROCEDURE — 6370000000 HC RX 637 (ALT 250 FOR IP): Performed by: NURSE PRACTITIONER

## 2020-06-15 PROCEDURE — 94761 N-INVAS EAR/PLS OXIMETRY MLT: CPT

## 2020-06-15 PROCEDURE — 36415 COLL VENOUS BLD VENIPUNCTURE: CPT

## 2020-06-15 PROCEDURE — 97535 SELF CARE MNGMENT TRAINING: CPT

## 2020-06-15 PROCEDURE — 2580000003 HC RX 258: Performed by: STUDENT IN AN ORGANIZED HEALTH CARE EDUCATION/TRAINING PROGRAM

## 2020-06-15 PROCEDURE — 97116 GAIT TRAINING THERAPY: CPT

## 2020-06-15 PROCEDURE — 97110 THERAPEUTIC EXERCISES: CPT

## 2020-06-15 PROCEDURE — 2060000000 HC ICU INTERMEDIATE R&B

## 2020-06-15 PROCEDURE — 82947 ASSAY GLUCOSE BLOOD QUANT: CPT

## 2020-06-15 PROCEDURE — 85025 COMPLETE CBC W/AUTO DIFF WBC: CPT

## 2020-06-15 PROCEDURE — 80048 BASIC METABOLIC PNL TOTAL CA: CPT

## 2020-06-15 PROCEDURE — 93010 ELECTROCARDIOGRAM REPORT: CPT | Performed by: INTERNAL MEDICINE

## 2020-06-15 RX ORDER — SPIRONOLACTONE 25 MG/1
50 TABLET ORAL DAILY
Status: DISCONTINUED | OUTPATIENT
Start: 2020-06-15 | End: 2020-06-16

## 2020-06-15 RX ORDER — INSULIN GLARGINE 100 [IU]/ML
10 INJECTION, SOLUTION SUBCUTANEOUS NIGHTLY
Status: DISCONTINUED | OUTPATIENT
Start: 2020-06-15 | End: 2020-06-16

## 2020-06-15 RX ORDER — SODIUM HYPOCHLORITE 1.25 MG/ML
SOLUTION TOPICAL 2 TIMES DAILY
Status: DISCONTINUED | OUTPATIENT
Start: 2020-06-15 | End: 2020-06-17 | Stop reason: HOSPADM

## 2020-06-15 RX ADMIN — SODIUM CHLORIDE, PRESERVATIVE FREE 10 ML: 5 INJECTION INTRAVENOUS at 21:00

## 2020-06-15 RX ADMIN — LISINOPRIL 5 MG: 5 TABLET ORAL at 08:56

## 2020-06-15 RX ADMIN — ACETAMINOPHEN 650 MG: 325 TABLET ORAL at 20:59

## 2020-06-15 RX ADMIN — ACETAMINOPHEN 650 MG: 325 TABLET ORAL at 14:48

## 2020-06-15 RX ADMIN — ACETAMINOPHEN 650 MG: 325 TABLET ORAL at 04:52

## 2020-06-15 RX ADMIN — INSULIN LISPRO 2 UNITS: 100 INJECTION, SOLUTION INTRAVENOUS; SUBCUTANEOUS at 17:40

## 2020-06-15 RX ADMIN — INSULIN LISPRO 3 UNITS: 100 INJECTION, SOLUTION INTRAVENOUS; SUBCUTANEOUS at 20:53

## 2020-06-15 RX ADMIN — LABETALOL HCL 50 MG: 100 TABLET, FILM COATED ORAL at 20:51

## 2020-06-15 RX ADMIN — SODIUM CHLORIDE, PRESERVATIVE FREE 10 ML: 5 INJECTION INTRAVENOUS at 08:57

## 2020-06-15 RX ADMIN — ATORVASTATIN CALCIUM 10 MG: 10 TABLET, FILM COATED ORAL at 20:52

## 2020-06-15 RX ADMIN — ENOXAPARIN SODIUM 30 MG: 30 INJECTION SUBCUTANEOUS at 08:56

## 2020-06-15 RX ADMIN — INSULIN GLARGINE 10 UNITS: 100 INJECTION, SOLUTION SUBCUTANEOUS at 20:53

## 2020-06-15 RX ADMIN — LABETALOL HCL 50 MG: 100 TABLET, FILM COATED ORAL at 14:48

## 2020-06-15 RX ADMIN — INSULIN LISPRO 4 UNITS: 100 INJECTION, SOLUTION INTRAVENOUS; SUBCUTANEOUS at 12:31

## 2020-06-15 RX ADMIN — TIZANIDINE 2 MG: 2 TABLET ORAL at 20:51

## 2020-06-15 RX ADMIN — AMLODIPINE BESYLATE 10 MG: 10 TABLET ORAL at 08:56

## 2020-06-15 RX ADMIN — ACETAMINOPHEN 650 MG: 325 TABLET ORAL at 08:56

## 2020-06-15 RX ADMIN — SPIRONOLACTONE 50 MG: 25 TABLET ORAL at 14:48

## 2020-06-15 RX ADMIN — INSULIN LISPRO 2 UNITS: 100 INJECTION, SOLUTION INTRAVENOUS; SUBCUTANEOUS at 08:50

## 2020-06-15 RX ADMIN — LABETALOL HCL 50 MG: 100 TABLET, FILM COATED ORAL at 06:00

## 2020-06-15 RX ADMIN — DAKIN'S SOLUTION 0.125% (QUARTER STRENGTH): 0.12 SOLUTION at 21:00

## 2020-06-15 RX ADMIN — DAKIN'S SOLUTION 0.125% (QUARTER STRENGTH): 0.12 SOLUTION at 14:50

## 2020-06-15 ASSESSMENT — PAIN SCALES - GENERAL
PAINLEVEL_OUTOF10: 5
PAINLEVEL_OUTOF10: 7
PAINLEVEL_OUTOF10: 6
PAINLEVEL_OUTOF10: 5
PAINLEVEL_OUTOF10: 0
PAINLEVEL_OUTOF10: 2
PAINLEVEL_OUTOF10: 0

## 2020-06-15 ASSESSMENT — PAIN DESCRIPTION - PROGRESSION: CLINICAL_PROGRESSION: GRADUALLY IMPROVING

## 2020-06-15 ASSESSMENT — PAIN - FUNCTIONAL ASSESSMENT
PAIN_FUNCTIONAL_ASSESSMENT: ACTIVITIES ARE NOT PREVENTED
PAIN_FUNCTIONAL_ASSESSMENT: ACTIVITIES ARE NOT PREVENTED

## 2020-06-15 ASSESSMENT — PAIN DESCRIPTION - LOCATION
LOCATION: HEAD
LOCATION: ANKLE

## 2020-06-15 ASSESSMENT — PAIN DESCRIPTION - ONSET: ONSET: ON-GOING

## 2020-06-15 ASSESSMENT — PAIN DESCRIPTION - PAIN TYPE
TYPE: ACUTE PAIN
TYPE: ACUTE PAIN

## 2020-06-15 ASSESSMENT — PAIN DESCRIPTION - DESCRIPTORS
DESCRIPTORS: ACHING;DISCOMFORT
DESCRIPTORS: DISCOMFORT;HEADACHE

## 2020-06-15 ASSESSMENT — PAIN DESCRIPTION - FREQUENCY
FREQUENCY: INTERMITTENT
FREQUENCY: INTERMITTENT

## 2020-06-15 ASSESSMENT — PAIN DESCRIPTION - ORIENTATION: ORIENTATION: LEFT

## 2020-06-15 NOTE — PROGRESS NOTES
betadine swab; slough is loosely adherent but painful. Saline moistened gauze applied. Recommend autolytic debridement using Dakins moistened gauze dressing BID. Response to treatment:  With complaints of pain. Plan   Plan of Care: Wound 06/15/20 Heel Left;Posterior-Dressing/Treatment: Moist to moist, Moisten with saline    BID Moist to moist dressing with Dakins. Cover with ABD     Current Diet: DIET CARB CONTROL;   Dietary Nutrition Supplements: Wound Healing Oral Supplement      Discharge Plan:  Placement for patient upon discharge: skilled nursing    Patient appropriate for Outpatient [address]: Yes       STEPHANY PICKERINGN, RN, [employer]

## 2020-06-15 NOTE — PROGRESS NOTES
Physical Therapy  Facility/Department: [facility]  Daily Treatment Note  NAME: Silvana Goldstein  : 1960  MRN: [id number]    Date of Service: 6/15/2020    Discharge Recommendations:  Patient would benefit from continued therapy after discharge   PT Equipment Recommendations  Equipment Needed: (TBD)    Assessment   Body structures, Functions, Activity limitations: Decreased functional mobility ; Decreased strength;Decreased endurance;Decreased balance;Decreased high-level IADLs;Decreased fine motor control;Decreased vision/visual deficit  Assessment: Pt continues to displays LE weakness and balance deficit, pt requiring Juan when ambulating w/o AD d/t Intermittent staggering, CGA with RW, pt fatigues quickly; Pt would benefit from continued PT for strengthening, endurance, balance and increased Fords with all mobility   Prognosis: Good  Decision Making: Medium Complexity  PT Education: Goals;Transfer Training;Gait Training;Functional Mobility Training;General Safety  Barriers to Learning: none  REQUIRES PT FOLLOW UP: Yes  Activity Tolerance  Activity Tolerance: Patient Tolerated treatment well     Patient Diagnosis(es): The encounter diagnosis was Intracranial hemorrhage ([facility] [address] has a past medical history of Chronic headache, Chronic headache, Elevated liver enzymes, Esophageal reflux, Exposure to hepatitis C, Hepatitis C, Hypertension, Major depression, and Major depression. has no past surgical history on file. Restrictions  Restrictions/Precautions  Restrictions/Precautions: General Precautions, Fall Risk  Required Braces or Orthoses?: No  Position Activity Restriction  Other position/activity restrictions: Up with assistance   Subjective   General  Response To Previous Treatment: Patient with no complaints from previous session.   Family / Caregiver Present: No  Subjective  Subjective: RN and pt agreed to PT, pt awake in bed upon arrival and anxious to participate with PT, pt very pleasant and

## 2020-06-15 NOTE — PROGRESS NOTES
Oral Nightly    enoxaparin  30 mg Subcutaneous BID    sodium chloride flush  10 mL Intravenous 2 times per day    insulin lispro  0-12 Units Subcutaneous TID WC    insulin lispro  0-6 Units Subcutaneous Nightly    insulin glargine  5 Units Subcutaneous Nightly    amLODIPine  10 mg Oral Daily     CONTINUOUS INFUSIONS:   dextrose       PRN MEDICATIONS:   calcium carbonate, sodium chloride flush, acetaminophen, promethazine **OR** ondansetron, labetalol, glucose, dextrose, glucagon (rDNA), dextrose, hydrALAZINE    VITALS:  Temperature Range: Temp: 98.6 °F (37 °C) Temp  Av.2 °F (36.8 °C)  Min: 97.6 °F (36.4 °C)  Max: 98.8 °F (37.1 °C)  BP Range: Systolic (66LAS), SZO:374 , Min:115 , NC     Diastolic (10DRR), ILS:12, Min:49, Max:91    Pulse Range: Pulse  Av.9  Min: 62  Max: 72  Respiration Range: Resp  Av.7  Min: 10  Max: 20  Current Pulse Ox: SpO2: 98 %  24HR Pulse Ox Range: SpO2  Av.3 %  Min: 95 %  Max: 100 %  Patient Vitals for the past 12 hrs:   BP Temp Temp src Pulse Resp SpO2   06/15/20 0352 (!) 143/49 98.6 °F (37 °C) Oral 63 17 98 %   20 2312 115/60 98.2 °F (36.8 °C) Oral 62 10 96 %   20 2046 (!) 199/91 98.8 °F (37.1 °C) Oral 72 16 95 %     Estimated body mass index is 39.88 kg/m² as calculated from the following:    Height as of this encounter: 5' 7\" (1.702 m).     Weight as of this encounter: 254 lb 10.1 oz (115.5 kg).  []<16 Severe malnutrition  []16-16.99 Moderate malnutrition  []17-18.49 Mild malnutrition  []18.5-24.9 Normal  []25-29.9 Overweight (not obese)  [x]30-34.9 Obese class 1 (Low Risk)  []35-39.9 Obese class 2 (Moderate Risk)  []?40 Obese class 3 (High Risk)    RECENT LABS:   Lab Results   Component Value Date    WBC 5.6 06/15/2020    HGB 13.3 06/15/2020    HCT 42.5 06/15/2020     06/15/2020    CHOL 163 2020    TRIG 277 (H) 2020    HDL 33 (L) 2020    ALT 45 (H) 2020    AST 29 2020     06/15/2020    K 4.1 06/15/2020  06/15/2020    CREATININE 1.18 (H) 06/15/2020    BUN 31 (H) 06/15/2020    CO2 23 06/15/2020    INR 0.9 06/11/2020    LABA1C 9.3 (H) 06/12/2020     24 HOUR INTAKE/OUTPUT:    Intake/Output Summary (Last 24 hours) at 6/15/2020 0653  Last data filed at 6/15/2020 0442  Gross per 24 hour   Intake 1110 ml   Output 1900 ml   Net -790 ml       IMAGING:   Place summary of recent imaging here. Head CT: 6/12/2020  Impression   Stable intraparenchymal hemorrhage within the ventral aspect of the linda.        CT head 6/11/2020:  Acute hemorrhage ventral linda.     MRI brain      Acute to early subacute intraparenchymal hematoma within the linda.               Labs and Images reviewed with:  [x] Dr. Jerome Naik. Gui    [] Dr. Keaton Cronin  [] Dr. Jennifer Merritt  [] There are no new interval images to review. 2D echo 6/13/2020- FINDINGS  Left Atrium  Left atrium is moderately dilated. Left Ventricle  Left ventricle is normal in size with normal systolic function globally. Calculated ejection fraction is 56%. Mild concentric left ventricular hypertrophy. Grade I (mild) left ventricular diastolic dysfunction. Right Atrium  Right atrium is normal in size. Right Ventricle  Normal right ventricular size and function. Mitral Valve  Mild thickening of the mitral leaflets without stenosis. Mild mitral regurgitation. Aortic Valve  Aortic valve is trileaflet. Aortic sclerosis without stenosis. Mild aortic insufficiency. Tricuspid Valve  Tricuspid valve structure is normal.  Mild tricuspid regurgitation. Estimated right ventricular systolic pressure is 36 mmHg. Pulmonic Valve  The pulmonic valve is normal in structure. PHYSICAL EXAM        General Examination    General Resting comfortably in bed   Head Normocephalic, without obvious abnormality   Neck Supple, symmetrical. Good ROM. No midline or paraspinal tenderness.     Lungs Respirations unlabored, no wheezing   Chest Wall No deformity   Heart RRR, no murmur Yes      [x] 0  [] 1   Location    Supratentorial Origin                   Infratentorial Origin     [] 0  [x] 1   TOTAL 1   0= 10%             1=13%           2= 26%              3=72%             4 = 97%         5 & 6 = 100%    ASSESSMENT AND PLAN:       59y/o F h/o untreated HTN, untreated T2DM [facility]2 a headache, slurred speech, difficulty using her right arm/walking. LKW 12:30 am night prior. NIHSS 8. 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 , EF: 56%   -outpatient sleep study  - Goal SBP <140  - Neuro checks per protocol  - F/u A1C, LDL, thrombotic stroke prevention given her underlying risk    CARDIOVASCULAR:  - Goal SBP <40  - starting labetalol 50 mg tid  - 2D echo as above  - Continue telemetry  - f/u outpatient PCP within 1 week for close monitoring and adjustment of BP    PULMONARY:  - on RA,  Requires NC over night   - recommend outpatient sleep study. Also AM HA, AM HTN, daytime sleepiness, wakes snoring at night. RENAL/FLUID/ELECTROLYTE:  - BUN/Cr 31/1. 18. Stable with admission, likely underlying CKD. - monitor KFT per PCP, consider CKD w/o outpatient  - tolerating diet, no IVF  - Replace electrolytes PRN  - Daily BMP    GI/NUTRITION:  NUTRITION:  DIET CARB CONTROL; Dietary Nutrition Supplements: Wound Healing Oral Supplement  - Bowel regimen: prn MOM  - GI prophylaxis: not needed    ID:  - Tmax normothermic  - Infectious work up- WBC wnl, no fevers  - Continue to monitor for fevers  - Daily CBC    HEME:   - H&H wnl  - Platelets wnl  - Daily CBC    ENDOCRINE:  - Continue to monitor blood glucose, goal <180  - A1C 9.3. Lantus 5 u nightly.   Medium ISS.    - f/u outpatient PC for T2DM

## 2020-06-15 NOTE — CARE COORDINATION
TRANSITIONAL CARE PLANNING/ 2 Rehab Saw Day: 4    Reason for Admission: Intracranial hemorrhage ([facility] [address] [I62.9]  Pontine hemorrhage ([facility] [address] [I61.3]  Pontine hemorrhage ([facility] [address] [I61.3]     Treatment Plan of Care: Pt/OT monitor labs    Tests/Procedures still needed: none    Barriers to Discharge:  Awaiting precert for ARU    Readmission Risk              Risk of Unplanned Readmission:        9            Patient goals/Treatment Preferences/Transitional Plan:     Referrals Made: n/a    Follow Up needed:   Added to AVS    Precert started for [facility]

## 2020-06-15 NOTE — DISCHARGE SUMMARY
Medication: Labetalol 200 TID, Amlodipine 10, discontinue Lisinopril, resumed home medication of Aldactone 50 increased lantus 15 units and  2 units of insulin before meals. Patient needs to have a tight blood pressure control with SBP<140, HbA1c< 7.0 . Patients needs to follow up with PCP, she has been given [facility] number. Diabetes education outpatient. Patient educated on importance of medication compliance as she has high risk factors. PROCEDURES:      PHYSICAL EXAMINATION        Discharge Vitals:  height is 5' 7\" (1.702 m) and weight is 254 lb 10.1 oz (115.5 kg). Her oral temperature is 97.9 °F (36.6 °C). Her blood pressure is 172/100 (abnormal) and her pulse is 61. Her respiration is 20 and oxygen saturation is 96%. CONSTITUTIONAL:  Well developed, well nourished, alert and oriented x 3, in no acute distress. GCS 15. Nontoxic. No dysarthria . No aphasia .    HEAD:  normocephalic, atraumatic    EYES:  PERRLA, EOMI.   ENT:  moist mucous membranes   NECK:  supple, symmetric   LUNGS:  Equal air entry bilaterally   CARDIOVASCULAR:  normal s1 / s2, RRR, distal pulses intact   ABDOMEN:  Soft, no rigidity   NEUROLOGIC:  Mental Status:  A & O x3,awake             Cranial Nerves:    cranial nerves II-XII are grossly intact    Motor Exam:    Drift:  absent  Tone:  normal    Motor exam is symmetrical 5 out of 5 all extremities bilaterally    Sensory:    Touch:    Right Upper Extremity:  normal  Left Upper Extremity:  normal  Right Lower Extremity:  normal  Left Lower Extremity:  normal    Deep Tendon Reflexes:    Right Bicep:  2+  Left Bicep:  2+  Right Knee:  2+  Left Knee:  2+    Plantar Response:  Right:  downgoing  Left:  downgoing    Clonus:  N/A  Land's:  N/A    Coordination/Dysmetria:  Heel to Shin:  Right:  normal  Finger to Nose:   Right:  normal  Left:  normal   Dysdiadochokinesia:  N/A    Gait:  normal        LABS/IMAGING     Recent Labs     06/15/20  4040 06/16/20  0083 these medications    sertraline 50 MG tablet  Commonly known as:  ZOLOFT     spironolactone 50 MG tablet  Commonly known as:  ALDACTONE     tiZANidine 4 MG tablet  Commonly known as:  Yuriy Hastings taking these medications    ibuprofen 600 MG tablet  Commonly known as:  ADVIL;MOTRIN           Where to Get Your Medications      These medications were sent to [facility] [address], [address]  [address], [facility] [place] [address]    Phone:  [phone]   · amLODIPine 10 MG tablet  · atorvastatin 10 MG tablet  · docusate sodium 100 MG capsule  · glucose monitoring kit monitoring kit  · insulin glargine 100 UNIT/ML injection vial  · insulin lispro 100 UNIT/ML injection vial  · [employer] 29G 29G X 12MM Misc  · labetalol 200 MG tablet  · senna 8.6 MG tablet  · sodium hypochlorite 0.125 % Soln external solution     You can get these medications from any pharmacy    Bring a paper prescription for each of these medications  · blood glucose test strips       Diet: DIET CARB CONTROL; Dietary Nutrition Supplements: Wound Healing Oral Supplement diet as tolerated  Activity:  As tolerated   Follow-up: With Neurology clinic, PCP and Neuro critical care.    Time Spent for discharge: 30 minutes    Roxianne Duverney, MD  Neuro Critical Care  6/17/2020, 4:58 PM

## 2020-06-16 LAB
ABSOLUTE EOS #: 0.24 K/UL (ref 0–0.44)
ABSOLUTE IMMATURE GRANULOCYTE: <0.03 K/UL (ref 0–0.3)
ABSOLUTE LYMPH #: 1.53 K/UL (ref 1.1–3.7)
ABSOLUTE MONO #: 0.59 K/UL (ref 0.1–1.2)
ANION GAP SERPL CALCULATED.3IONS-SCNC: 13 MMOL/L (ref 9–17)
BASOPHILS # BLD: 0 % (ref 0–2)
BASOPHILS ABSOLUTE: <0.03 K/UL (ref 0–0.2)
BUN BLDV-MCNC: 28 MG/DL (ref 6–20)
BUN/CREAT BLD: ABNORMAL (ref 9–20)
CALCIUM SERPL-MCNC: 9.2 MG/DL (ref 8.6–10.4)
CHLORIDE BLD-SCNC: 106 MMOL/L (ref 98–107)
CO2: 24 MMOL/L (ref 20–31)
CREAT SERPL-MCNC: 1 MG/DL (ref 0.5–0.9)
DIFFERENTIAL TYPE: ABNORMAL
EOSINOPHILS RELATIVE PERCENT: 5 % (ref 1–4)
GFR AFRICAN AMERICAN: >60 ML/MIN
GFR NON-AFRICAN AMERICAN: 57 ML/MIN
GFR SERPL CREATININE-BSD FRML MDRD: ABNORMAL ML/MIN/{1.73_M2}
GFR SERPL CREATININE-BSD FRML MDRD: ABNORMAL ML/MIN/{1.73_M2}
GLUCOSE BLD-MCNC: 149 MG/DL (ref 65–105)
GLUCOSE BLD-MCNC: 153 MG/DL (ref 65–105)
GLUCOSE BLD-MCNC: 154 MG/DL (ref 65–105)
GLUCOSE BLD-MCNC: 168 MG/DL (ref 70–99)
GLUCOSE BLD-MCNC: 198 MG/DL (ref 65–105)
GLUCOSE BLD-MCNC: 202 MG/DL (ref 65–105)
HCT VFR BLD CALC: 41.1 % (ref 36.3–47.1)
HEMOGLOBIN: 12.9 G/DL (ref 11.9–15.1)
IMMATURE GRANULOCYTES: 0 %
LYMPHOCYTES # BLD: 29 % (ref 24–43)
MCH RBC QN AUTO: 30 PG (ref 25.2–33.5)
MCHC RBC AUTO-ENTMCNC: 31.4 G/DL (ref 28.4–34.8)
MCV RBC AUTO: 95.6 FL (ref 82.6–102.9)
MONOCYTES # BLD: 11 % (ref 3–12)
NRBC AUTOMATED: 0 PER 100 WBC
PDW BLD-RTO: 13.2 % (ref 11.8–14.4)
PLATELET # BLD: 247 K/UL (ref 138–453)
PLATELET ESTIMATE: ABNORMAL
PMV BLD AUTO: 10.8 FL (ref 8.1–13.5)
POTASSIUM SERPL-SCNC: 4.3 MMOL/L (ref 3.7–5.3)
RBC # BLD: 4.3 M/UL (ref 3.95–5.11)
RBC # BLD: ABNORMAL 10*6/UL
SEG NEUTROPHILS: 55 % (ref 36–65)
SEGMENTED NEUTROPHILS ABSOLUTE COUNT: 2.96 K/UL (ref 1.5–8.1)
SODIUM BLD-SCNC: 143 MMOL/L (ref 135–144)
WBC # BLD: 5.4 K/UL (ref 3.5–11.3)
WBC # BLD: ABNORMAL 10*3/UL

## 2020-06-16 PROCEDURE — 92507 TX SP LANG VOICE COMM INDIV: CPT

## 2020-06-16 PROCEDURE — 85025 COMPLETE CBC W/AUTO DIFF WBC: CPT

## 2020-06-16 PROCEDURE — 99233 SBSQ HOSP IP/OBS HIGH 50: CPT | Performed by: PSYCHIATRY & NEUROLOGY

## 2020-06-16 PROCEDURE — 6360000002 HC RX W HCPCS: Performed by: NURSE PRACTITIONER

## 2020-06-16 PROCEDURE — 6360000002 HC RX W HCPCS: Performed by: STUDENT IN AN ORGANIZED HEALTH CARE EDUCATION/TRAINING PROGRAM

## 2020-06-16 PROCEDURE — 2580000003 HC RX 258: Performed by: NURSE PRACTITIONER

## 2020-06-16 PROCEDURE — 80048 BASIC METABOLIC PNL TOTAL CA: CPT

## 2020-06-16 PROCEDURE — 2060000000 HC ICU INTERMEDIATE R&B

## 2020-06-16 PROCEDURE — 97110 THERAPEUTIC EXERCISES: CPT

## 2020-06-16 PROCEDURE — 6370000000 HC RX 637 (ALT 250 FOR IP): Performed by: NURSE PRACTITIONER

## 2020-06-16 PROCEDURE — 6370000000 HC RX 637 (ALT 250 FOR IP): Performed by: PSYCHIATRY & NEUROLOGY

## 2020-06-16 PROCEDURE — 2580000003 HC RX 258: Performed by: STUDENT IN AN ORGANIZED HEALTH CARE EDUCATION/TRAINING PROGRAM

## 2020-06-16 PROCEDURE — 6370000000 HC RX 637 (ALT 250 FOR IP): Performed by: STUDENT IN AN ORGANIZED HEALTH CARE EDUCATION/TRAINING PROGRAM

## 2020-06-16 PROCEDURE — 36415 COLL VENOUS BLD VENIPUNCTURE: CPT

## 2020-06-16 PROCEDURE — 97530 THERAPEUTIC ACTIVITIES: CPT

## 2020-06-16 PROCEDURE — 99211 OFF/OP EST MAY X REQ PHY/QHP: CPT

## 2020-06-16 RX ORDER — SPIRONOLACTONE 100 MG/1
100 TABLET, FILM COATED ORAL DAILY
Status: DISCONTINUED | OUTPATIENT
Start: 2020-06-17 | End: 2020-06-17 | Stop reason: HOSPADM

## 2020-06-16 RX ORDER — LABETALOL 100 MG/1
100 TABLET, FILM COATED ORAL EVERY 8 HOURS SCHEDULED
Status: DISCONTINUED | OUTPATIENT
Start: 2020-06-16 | End: 2020-06-16

## 2020-06-16 RX ORDER — LABETALOL 100 MG/1
100 TABLET, FILM COATED ORAL ONCE
Status: COMPLETED | OUTPATIENT
Start: 2020-06-16 | End: 2020-06-16

## 2020-06-16 RX ORDER — INSULIN GLARGINE 100 [IU]/ML
15 INJECTION, SOLUTION SUBCUTANEOUS NIGHTLY
Status: DISCONTINUED | OUTPATIENT
Start: 2020-06-16 | End: 2020-06-17 | Stop reason: HOSPADM

## 2020-06-16 RX ORDER — SPIRONOLACTONE 25 MG/1
50 TABLET ORAL ONCE
Status: COMPLETED | OUTPATIENT
Start: 2020-06-16 | End: 2020-06-16

## 2020-06-16 RX ORDER — LABETALOL 200 MG/1
200 TABLET, FILM COATED ORAL EVERY 8 HOURS SCHEDULED
Status: DISCONTINUED | OUTPATIENT
Start: 2020-06-16 | End: 2020-06-17 | Stop reason: HOSPADM

## 2020-06-16 RX ORDER — 0.9 % SODIUM CHLORIDE 0.9 %
500 INTRAVENOUS SOLUTION INTRAVENOUS ONCE
Status: COMPLETED | OUTPATIENT
Start: 2020-06-16 | End: 2020-06-16

## 2020-06-16 RX ADMIN — INSULIN LISPRO 2 UNITS: 100 INJECTION, SOLUTION INTRAVENOUS; SUBCUTANEOUS at 11:53

## 2020-06-16 RX ADMIN — ACETAMINOPHEN 650 MG: 325 TABLET ORAL at 21:24

## 2020-06-16 RX ADMIN — DAKIN'S SOLUTION 0.125% (QUARTER STRENGTH): 0.12 SOLUTION at 21:03

## 2020-06-16 RX ADMIN — SODIUM CHLORIDE, PRESERVATIVE FREE 10 ML: 5 INJECTION INTRAVENOUS at 08:04

## 2020-06-16 RX ADMIN — ENOXAPARIN SODIUM 30 MG: 30 INJECTION SUBCUTANEOUS at 08:03

## 2020-06-16 RX ADMIN — LABETALOL HCL 50 MG: 100 TABLET, FILM COATED ORAL at 06:00

## 2020-06-16 RX ADMIN — SODIUM CHLORIDE, PRESERVATIVE FREE 10 ML: 5 INJECTION INTRAVENOUS at 20:47

## 2020-06-16 RX ADMIN — LABETALOL HCL 100 MG: 100 TABLET, FILM COATED ORAL at 17:16

## 2020-06-16 RX ADMIN — LABETALOL HCL 100 MG: 100 TABLET, FILM COATED ORAL at 15:23

## 2020-06-16 RX ADMIN — SODIUM CHLORIDE 500 ML: 9 INJECTION, SOLUTION INTRAVENOUS at 11:49

## 2020-06-16 RX ADMIN — INSULIN GLARGINE 15 UNITS: 100 INJECTION, SOLUTION SUBCUTANEOUS at 20:42

## 2020-06-16 RX ADMIN — AMLODIPINE BESYLATE 10 MG: 10 TABLET ORAL at 08:03

## 2020-06-16 RX ADMIN — SPIRONOLACTONE 50 MG: 25 TABLET ORAL at 11:53

## 2020-06-16 RX ADMIN — INSULIN LISPRO 4 UNITS: 100 INJECTION, SOLUTION INTRAVENOUS; SUBCUTANEOUS at 15:58

## 2020-06-16 RX ADMIN — INSULIN LISPRO 1 UNITS: 100 INJECTION, SOLUTION INTRAVENOUS; SUBCUTANEOUS at 08:04

## 2020-06-16 RX ADMIN — SPIRONOLACTONE 50 MG: 25 TABLET ORAL at 08:03

## 2020-06-16 RX ADMIN — DAKIN'S SOLUTION 0.125% (QUARTER STRENGTH): 0.12 SOLUTION at 10:49

## 2020-06-16 RX ADMIN — ACETAMINOPHEN 650 MG: 325 TABLET ORAL at 01:03

## 2020-06-16 RX ADMIN — ATORVASTATIN CALCIUM 10 MG: 10 TABLET, FILM COATED ORAL at 20:42

## 2020-06-16 RX ADMIN — TIZANIDINE 2 MG: 2 TABLET ORAL at 20:42

## 2020-06-16 RX ADMIN — INSULIN LISPRO 1 UNITS: 100 INJECTION, SOLUTION INTRAVENOUS; SUBCUTANEOUS at 20:43

## 2020-06-16 RX ADMIN — LABETALOL HCL 200 MG: 200 TABLET, FILM COATED ORAL at 21:03

## 2020-06-16 RX ADMIN — ACETAMINOPHEN 650 MG: 325 TABLET ORAL at 08:20

## 2020-06-16 RX ADMIN — HYDRALAZINE HYDROCHLORIDE 10 MG: 20 INJECTION INTRAMUSCULAR; INTRAVENOUS at 08:21

## 2020-06-16 RX ADMIN — ACETAMINOPHEN 650 MG: 325 TABLET ORAL at 17:20

## 2020-06-16 ASSESSMENT — PAIN DESCRIPTION - ORIENTATION
ORIENTATION: LEFT
ORIENTATION: LEFT

## 2020-06-16 ASSESSMENT — PAIN DESCRIPTION - LOCATION
LOCATION: ANKLE
LOCATION: ANKLE

## 2020-06-16 ASSESSMENT — PAIN SCALES - GENERAL
PAINLEVEL_OUTOF10: 5
PAINLEVEL_OUTOF10: 4
PAINLEVEL_OUTOF10: 5
PAINLEVEL_OUTOF10: 3
PAINLEVEL_OUTOF10: 7
PAINLEVEL_OUTOF10: 7

## 2020-06-16 ASSESSMENT — PAIN DESCRIPTION - DESCRIPTORS
DESCRIPTORS: ACHING;DISCOMFORT
DESCRIPTORS: ACHING;CONSTANT;DISCOMFORT

## 2020-06-16 ASSESSMENT — PAIN DESCRIPTION - FREQUENCY: FREQUENCY: INTERMITTENT

## 2020-06-16 ASSESSMENT — PAIN DESCRIPTION - PROGRESSION: CLINICAL_PROGRESSION: GRADUALLY IMPROVING

## 2020-06-16 ASSESSMENT — PAIN DESCRIPTION - PAIN TYPE
TYPE: ACUTE PAIN
TYPE: ACUTE PAIN

## 2020-06-16 ASSESSMENT — PAIN DESCRIPTION - ONSET: ONSET: ON-GOING

## 2020-06-16 ASSESSMENT — PAIN - FUNCTIONAL ASSESSMENT: PAIN_FUNCTIONAL_ASSESSMENT: ACTIVITIES ARE NOT PREVENTED

## 2020-06-16 NOTE — PROGRESS NOTES
Nutrition Assessment    Type and Reason for Visit: Reassess    Nutrition Recommendations: Continue Pepe ONS twice daily. Carb controlled diet. Nutrition Assessment: Pt reports eating well and says she has recently been consuming the majority of all meals. Ate 100% of breakfast this morning. Drinking Pepe well. States she has been trying to limit her sodium intake during admission. Malnutrition Assessment:  · Malnutrition Status: No malnutrition  · Context: Acute illness or injury  · Findings of the 6 clinical characteristics of malnutrition (Minimum of 2 out of 6 clinical characteristics is required to make the diagnosis of moderate or severe Protein Calorie Malnutrition based on AND/ASPEN Guidelines):  1. Energy Intake-Greater than 75% of estimated energy requirement, Greater than or equal to 1 month    2. Weight Loss-No significant weight loss(per pt),    3. Fat Loss-No significant subcutaneous fat loss,    4. Muscle Loss-No significant muscle mass loss,    5. Fluid Accumulation-Mild fluid accumulation, Extremities  6.  Strength-Not measured    Nutrition Risk Level:  Moderate    Nutrient Needs:  · Estimated Daily Total Kcal: 6093-6200 kcals/day  · Estimated Daily Protein (g):  gm/day    Nutrition Diagnosis:   · Problem: Increased nutrient needs  · Etiology: related to (healing)     Signs and symptoms:  as evidenced by (non-healing, open wound on LLE)    Objective Information:  · Nutrition-Focused Physical Findings: Glucose 153-168 mg/dL, BUN 28 mg/dL, Cr 1 mg/dL  · Wound Type: Open Wounds(Traumatic wound, L heel)  · Current Nutrition Therapies:  · Oral Diet Orders: Carb Control 4 Carbs/Meal   · Oral Diet intake: 51-75%, %  · Oral Nutrition Supplement (ONS) Orders: Wound Healing Oral Supplement  · ONS intake: 51-75%, %  · Anthropometric Measures:  · Ht: 5' 7\" (170.2 cm)   · Current Body Wt: 254 lb 10.1 oz (115.5 kg)  · Usual Body Wt: (250-264 lbs per pt)  · Ideal Body Wt: 135 lb

## 2020-06-16 NOTE — DISCHARGE INSTR - COC
pain score (0-10 scale): Pain Level: 6  Last Weight:   Wt Readings from Last 1 Encounters:   06/11/20 254 lb 10.1 oz (115.5 kg)     Mental Status:  {IP PT MENTAL STATUS:14331}    IV Access:  { MARINA IV ACCESS:236763917}    Nursing Mobility/ADLs:  Walking   {CHP DME OQNW:109114302}  Transfer  {CHP DME SBGZ:585160933}  Bathing  {CHP DME CRQS:388951473}  Dressing  {CHP DME BOGQ:297129506}  Toileting  {CHP DME GIPK:871577294}  Feeding  {CHP DME EQLQ:643407379}  Med Admin  {CHP DME CNRQ:987206640}  Med Delivery   { MARINA MED Delivery:091318484}    Wound Care Documentation and Therapy:  Wound 06/15/20 Heel Left;Posterior (Active)   Wound Image   6/17/2020  9:57 AM   Wound Traumatic 6/17/2020  9:57 AM   Dressing Status Changed 6/17/2020  9:57 AM   Dressing Changed Changed/New 6/17/2020  9:57 AM   Dressing/Treatment Moist to moist;Pharmaceutical agent (see MAR) 6/17/2020  9:57 AM   Wound Cleansed Wound cleanser 6/17/2020  9:57 AM   Dressing Change Due 06/17/20 6/17/2020  9:57 AM   Wound Length (cm) 1 cm 6/16/2020 10:49 AM   Wound Width (cm) 3 cm 6/16/2020 10:49 AM   Wound Depth (cm) 0.4 cm 6/16/2020 10:49 AM   Wound Surface Area (cm^2) 3 cm^2 6/16/2020 10:49 AM   Change in Wound Size % (l*w) 0 6/16/2020 10:49 AM   Wound Volume (cm^3) 1.2 cm^3 6/16/2020 10:49 AM   Wound Healing % 0 6/16/2020 10:49 AM   Wound Assessment Pink;Yellow;Slough 6/17/2020  9:57 AM   Drainage Amount Small 6/17/2020  9:57 AM   Drainage Description Yellow 6/17/2020  9:57 AM   Pennie-wound Assessment Pink 6/17/2020  9:57 AM   Non-staged Wound Description Full thickness 6/17/2020  9:57 AM   [place]%Wound Bed 50 6/17/2020  9:57 AM   Yellow%Wound Bed 50 6/17/2020  9:57 AM   Number of days: 1     Continue 0.125% Dakins Moist to Moist Dressings BID until 100% pink/granular then OK to switch to collagen and alginate daily.        Elimination:  Continence:   · Bowel: {YES / CZ:61343}  · Bladder: {YES / JE:44498}  Urinary Catheter: {Urinary Catheter:889661683} Colostomy/Ileostomy/Ileal Conduit: {YES / FS:93223}       Date of Last BM: ***  No intake or output data in the 24 hours ending 20 1007  No intake/output data recorded.     Safety Concerns:     [address] [facility] Safety Concerns:184976062}    Impairments/Disabilities:      [address] [facility] Impairments/Disabilities:411596359}    Nutrition Therapy:  Current Nutrition Therapy:   [address] [facility] Diet List:802021868}    Routes of Feeding: {CHP DME Other Feedings:837211879}  Liquids: {Slp liquid thickness:11470}  Daily Fluid Restriction: {CHP DME Yes amt example:875290889}  Last Modified Barium Swallow with Video (Video Swallowing Test): {Done Not Done QKGX:619339837}    Treatments at the Time of Hospital Discharge:   Respiratory Treatments: ***  Oxygen Therapy:  {Therapy; copd oxygen:79594}  Ventilator:    {MH CC Vent DTJW:008684601}    Rehab Therapies: {THERAPEUTIC INTERVENTION:5337696855}  Weight Bearing Status/Restrictions: [address] [place] [address] Weight Bearin}  Other Medical Equipment (for information only, NOT a DME order):  {EQUIPMENT:009027215}  Other Treatments: ***    Patient's personal belongings (please select all that are sent with patient):  {[facility] DME Belongings:914073002}    RN SIGNATURE:  {Esignature:994122718}    CASE MANAGEMENT/SOCIAL WORK SECTION    Inpatient Status Date: ***    Readmission Risk Assessment Score:  Readmission Risk              Risk of Unplanned Readmission:        9           Discharging to Facility/ Agency   · Name:   · Address:  · Phone:  · Fax:    Dialysis Facility (if applicable)   · Name:  · Address:  · Dialysis Schedule:  · Phone:  · Fax:    / signature: {Esignature:536602036}    PHYSICIAN SECTION    Prognosis: {Prognosis:0302295056}    Condition at Discharge: [address] Patient Condition:909296848}    Rehab Potential (if transferring to Rehab): {Prognosis:4487517815}    Recommended Labs or Other Treatments After Discharge: ***    Physician Certification: I certify the above information and transfer of Leda Lim  is necessary for the continuing treatment of the diagnosis listed and that she requires {Admit to Appropriate Level of Care:47862} for {GREATER/LESS:420581281} 30 days.      Update Admission H&P: {CHP DME Changes in OKIOX:845208725}    PHYSICIAN SIGNATURE:  Electronically signed by Hellen Cramer MD on 6/16/20 at 11:09 AM EDT

## 2020-06-16 NOTE — PROGRESS NOTES
3-4         [x] 0  [] 1  [] 2   ICH Volume     < 30 mL (cc)                      ? 30 mL (cc)      [x] 0  [] 1   IVH      No                                     Yes      [x] 0  [] 1   Location    Supratentorial Origin                   Infratentorial Origin     [] 0  [x] 1   TOTAL 1   0= 10%             1=13%           2= 26%              3=72%             4 = 97%         5 & 6 = 100%    ASSESSMENT AND PLAN:       59y/o F h/o untreated HTN, untreated T2DM presented to f 6/11 2/2 a headache, slurred speech, difficulty using her right arm/walking. LKW 12:30 am night prior. NIHSS 8. 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 , EF: 56%   -outpatient sleep study  - Goal SBP <140  - Neuro checks per protocol  - F/u A1C, LDL, thrombotic stroke prevention given her underlying risk    CARDIOVASCULAR:  - Goal SBP <40  - starting labetalol 50 mg tid  - 2D echo as above  - Continue telemetry  - f/u outpatient PCP within 1 week for close monitoring and adjustment of BP    PULMONARY:  - on RA,  Requires NC over night   - recommend outpatient sleep study. Also AM HA, AM HTN, daytime sleepiness, wakes snoring at night. RENAL/FLUID/ELECTROLYTE:  - BUN/Cr 31/1. 18. Stable with admission, likely underlying CKD. - monitor KFT per PCP, consider CKD w/o outpatient  - tolerating diet, no IVF  - Replace electrolytes PRN  - Daily BMP    GI/NUTRITION:  NUTRITION:  DIET CARB CONTROL;   Dietary Nutrition Supplements: Wound Healing Oral Supplement  - Bowel regimen: prn MOM  - GI prophylaxis: not needed    ID:  - Tmax normothermic  - Infectious work up- WBC wnl, no fevers  - Continue to monitor for fevers  - Daily CBC    HEME:   - H&H wnl  - Platelets wnl  -

## 2020-06-16 NOTE — PROGRESS NOTES
Notified THERESA Caban CM, that precert was obtained and pt may admit when medically ready. Lenn Hammans states pt's BP was elevated today, and she will check to determine plan and when pt is medically ready for ARU. Writer requested d/c readmit be completed, DVT prophylaxis be continued, and report be called to [phone] when pt is medically ready. Admission Assessment completed and Dr Huong Akers notified. Notified Lenn Hammans pt had IV BP meds this morning and will need to be on PO meds with stable BP for 24hrs prior to ARU.

## 2020-06-16 NOTE — PROGRESS NOTES
[facility]   Acute Inpatient Rehab Preadmission Assessment    Patient Name: Enoch Gray        MRN: [id number]    : 1960  (64 y.o.)  Gender: female     Admitted from:   [facility]  [x][facility]   []Outside Admission - Location:                                 [x]Initial         []Updated    Date of Onset / admission to the acute hospital:  20    Impairment group:  1.1 CVA    Did patient have surgery? [x]No  []Yes:      Physicians: Maxwell Daniel for clinical complications: Moderate    Co-morbidities:  Hep C exposure with elevated liver enzymes, HTN, HLD, Depression, DM, ARF, dysarthria    Financial Information  Primary insurance:  []Medicare [] Medicare HMO  []Commercial insurance    []Medicaid   [x] Medicaid HMO []Workers Compensation   []Personal Pay    Secondary Insurance:  []Medicare [] Medicare HMO  []Commercial insurance    []Medicaid   []Workers Compensation [x]None    Precautions:   []Cardiac Precautions    []Total hip precautions    []Weight Bearing status:  [x]Safety Precautions/Concerns  [x]Visually impaired:  (L gaze preference, R neglect but able to tend to the R with cueing)     []Hard of Hearing     Isolation Precautions:         []Yes  [x]No  If Yes:  [] Droplet  []Contact []Airborne     []VRE     []MRSA     []C-diff         [] TB       [] Other:        Physiatrist:  [x]        []   Dr. Malena Andrade  []   Dr. Galvez Pod    Patients Occupation: Disabled    Reviewed Lab and Diagnostic reports from Current Admission: Yes    Patients Prior Functional  Level: Prior Function  ADL Assistance: Independent  Homemaking Assistance: Independent  Ambulation Assistance: Independent  Transfer Assistance: Independent  Additional Comments: Pt reports may be able to stay with son    History of current illness:  72-year-old female with history of hypertension, diabetes and it was outside hospital with headache, slurred speech and difficulty walking.   Have right ventral pontine bleed on CT head. TPE unremarkable chest x-ray unremarkable. On Cardene drip. Transferred from [facility]     Neuro critical care- intraparenchymal hemorrhage, noted desaturation at night    Current functional status for upper extremity ADLs:  UE Bathing: Increased time to complete, Stand by assistance(sat and stood while showering, used shower grab bars)  UE Dressing: Setup, Minimal assistance    Current functional status for lower extremity ADLs:  LE Bathing: Contact guard assistance  LE Dressing: Moderate assistance, Maximum assistance    Current functional status for bed, chair, wheelchair transfers:  Transfers  Sit to Stand: Stand by assistance  Stand to sit: Stand by assistance  Bed to Chair: Moderate assistance  Stand Pivot Transfers: Moderate Assistance  Lateral Transfers: Minimal Assistance  Comment: vc's for hand placement    Current functional status for toilet transfers:   Toilet Transfers  Toilet - Technique: Ambulating  Equipment Used: Raised toilet seat with rails  Toilet Transfer: Contact guard assistance    Current functional status for locomotion:  Ambulation  Ambulation?: Yes  More Ambulation?: Yes  Ambulation 1  Surface: level tile  Device: Rolling Walker  Assistance: Contact guard assistance  Quality of Gait: BLE weakness  Gait Deviations: Slow Dulce, Decreased step length, Shuffles, Decreased head and trunk rotation  Distance: 200ft  Comments: (pt requested not to sit up in chair b/c she's exhausted and wants to sleep; agreeable to participate with PT session)    Current functional status for comprehension: Complete independence    Current functional status for expression: Complete independence    Current functional status for social interaction: Complete independence    Current functional status for problem solving: Complete independence    Current functional status for memory: Complete independence    Current Deficits R/T Impairment: Impaired Functional

## 2020-06-16 NOTE — PROGRESS NOTES
Physical Therapy  Facility/Department: [facility] NEURO  Daily Treatment Note  NAME: Beatriz Sr  : 1960  MRN: [id number]    Date of Service: 2020    Discharge Recommendations:  Patient would benefit from continued therapy after discharge        Assessment   Body structures, Functions, Activity limitations: Decreased functional mobility ; Decreased strength;Decreased endurance;Decreased balance;Decreased high-level IADLs;Decreased fine motor control;Decreased vision/visual deficit  Assessment: Pt continues to displays LE weakness and balance deficits requiring Juan when ambulating w/o AD. Intermittent staggering, CGA with RW, pt fatigues quickly; Pt would benefit from continued PT for strengthening, endurance, balance and increased [place] with all mobility   Prognosis: Good  PT Education: Goals;Transfer Training;Gait Training;Functional Mobility Training;General Safety  REQUIRES PT FOLLOW UP: Yes  Activity Tolerance  Activity Tolerance: Patient Tolerated treatment well     Patient Diagnosis(es): The encounter diagnosis was Intracranial hemorrhage ([facility] [address] has a past medical history of Chronic headache, Chronic headache, Elevated liver enzymes, Esophageal reflux, Exposure to hepatitis C, Hepatitis C, Hypertension, Major depression, and Major depression. has no past surgical history on file. Restrictions  Restrictions/Precautions  Restrictions/Precautions: General Precautions, Fall Risk  Required Braces or Orthoses?: No  Position Activity Restriction  Other position/activity restrictions: Up with assistance   Subjective   General  Response To Previous Treatment: Patient with no complaints from previous session. Family / Caregiver Present: No  Subjective  Subjective: Pt up in chair. Eager to ambulate.   Pain Screening  Patient Currently in Pain: Denies  Vital Signs  Patient Currently in Pain: Denies       Orientation  Orientation  Overall Orientation Status: Within Normal Limits  Cognition

## 2020-06-17 VITALS
WEIGHT: 254.63 LBS | HEART RATE: 61 BPM | RESPIRATION RATE: 20 BRPM | BODY MASS INDEX: 39.97 KG/M2 | SYSTOLIC BLOOD PRESSURE: 172 MMHG | HEIGHT: 67 IN | OXYGEN SATURATION: 96 % | DIASTOLIC BLOOD PRESSURE: 100 MMHG | TEMPERATURE: 97.9 F

## 2020-06-17 LAB
ABSOLUTE EOS #: 0.25 K/UL (ref 0–0.44)
ABSOLUTE IMMATURE GRANULOCYTE: <0.03 K/UL (ref 0–0.3)
ABSOLUTE LYMPH #: 1.71 K/UL (ref 1.1–3.7)
ABSOLUTE MONO #: 0.55 K/UL (ref 0.1–1.2)
ANION GAP SERPL CALCULATED.3IONS-SCNC: 14 MMOL/L (ref 9–17)
BASOPHILS # BLD: 0 % (ref 0–2)
BASOPHILS ABSOLUTE: <0.03 K/UL (ref 0–0.2)
BUN BLDV-MCNC: 24 MG/DL (ref 6–20)
BUN/CREAT BLD: ABNORMAL (ref 9–20)
CALCIUM SERPL-MCNC: 9.2 MG/DL (ref 8.6–10.4)
CHLORIDE BLD-SCNC: 105 MMOL/L (ref 98–107)
CO2: 24 MMOL/L (ref 20–31)
CREAT SERPL-MCNC: 1.05 MG/DL (ref 0.5–0.9)
DIFFERENTIAL TYPE: NORMAL
EOSINOPHILS RELATIVE PERCENT: 4 % (ref 1–4)
GFR AFRICAN AMERICAN: >60 ML/MIN
GFR NON-AFRICAN AMERICAN: 54 ML/MIN
GFR SERPL CREATININE-BSD FRML MDRD: ABNORMAL ML/MIN/{1.73_M2}
GFR SERPL CREATININE-BSD FRML MDRD: ABNORMAL ML/MIN/{1.73_M2}
GLUCOSE BLD-MCNC: 158 MG/DL (ref 70–99)
GLUCOSE BLD-MCNC: 159 MG/DL (ref 65–105)
GLUCOSE BLD-MCNC: 187 MG/DL (ref 65–105)
HCT VFR BLD CALC: 42.4 % (ref 36.3–47.1)
HEMOGLOBIN: 13.3 G/DL (ref 11.9–15.1)
IMMATURE GRANULOCYTES: 0 %
LYMPHOCYTES # BLD: 30 % (ref 24–43)
MCH RBC QN AUTO: 29.8 PG (ref 25.2–33.5)
MCHC RBC AUTO-ENTMCNC: 31.4 G/DL (ref 28.4–34.8)
MCV RBC AUTO: 94.9 FL (ref 82.6–102.9)
MONOCYTES # BLD: 10 % (ref 3–12)
NRBC AUTOMATED: 0 PER 100 WBC
PDW BLD-RTO: 13 % (ref 11.8–14.4)
PLATELET # BLD: 271 K/UL (ref 138–453)
PLATELET ESTIMATE: NORMAL
PMV BLD AUTO: 10.2 FL (ref 8.1–13.5)
POTASSIUM SERPL-SCNC: 4.5 MMOL/L (ref 3.7–5.3)
RBC # BLD: 4.47 M/UL (ref 3.95–5.11)
RBC # BLD: NORMAL 10*6/UL
SEG NEUTROPHILS: 56 % (ref 36–65)
SEGMENTED NEUTROPHILS ABSOLUTE COUNT: 3.13 K/UL (ref 1.5–8.1)
SODIUM BLD-SCNC: 143 MMOL/L (ref 135–144)
WBC # BLD: 5.7 K/UL (ref 3.5–11.3)
WBC # BLD: NORMAL 10*3/UL

## 2020-06-17 PROCEDURE — 99211 OFF/OP EST MAY X REQ PHY/QHP: CPT

## 2020-06-17 PROCEDURE — 2580000003 HC RX 258: Performed by: STUDENT IN AN ORGANIZED HEALTH CARE EDUCATION/TRAINING PROGRAM

## 2020-06-17 PROCEDURE — 6370000000 HC RX 637 (ALT 250 FOR IP): Performed by: STUDENT IN AN ORGANIZED HEALTH CARE EDUCATION/TRAINING PROGRAM

## 2020-06-17 PROCEDURE — 97116 GAIT TRAINING THERAPY: CPT

## 2020-06-17 PROCEDURE — 97110 THERAPEUTIC EXERCISES: CPT

## 2020-06-17 PROCEDURE — 85025 COMPLETE CBC W/AUTO DIFF WBC: CPT

## 2020-06-17 PROCEDURE — 80048 BASIC METABOLIC PNL TOTAL CA: CPT

## 2020-06-17 PROCEDURE — 82947 ASSAY GLUCOSE BLOOD QUANT: CPT

## 2020-06-17 PROCEDURE — 99239 HOSP IP/OBS DSCHRG MGMT >30: CPT | Performed by: PSYCHIATRY & NEUROLOGY

## 2020-06-17 PROCEDURE — 97530 THERAPEUTIC ACTIVITIES: CPT

## 2020-06-17 PROCEDURE — 36415 COLL VENOUS BLD VENIPUNCTURE: CPT

## 2020-06-17 PROCEDURE — 6370000000 HC RX 637 (ALT 250 FOR IP): Performed by: PSYCHIATRY & NEUROLOGY

## 2020-06-17 RX ORDER — BLOOD-GLUCOSE METER
1 KIT MISCELLANEOUS DAILY
Qty: 1 KIT | Refills: 0 | Status: SHIPPED | OUTPATIENT
Start: 2020-06-17

## 2020-06-17 RX ORDER — ATORVASTATIN CALCIUM 10 MG/1
10 TABLET, FILM COATED ORAL NIGHTLY
Qty: 30 TABLET | Refills: 3 | Status: SHIPPED | OUTPATIENT
Start: 2020-06-17 | End: 2020-07-15 | Stop reason: SDUPTHER

## 2020-06-17 RX ORDER — GLUCOSAMINE HCL/CHONDROITIN SU 500-400 MG
CAPSULE ORAL
Qty: 30 STRIP | Refills: 0 | Status: SHIPPED | OUTPATIENT
Start: 2020-06-17 | End: 2020-07-06 | Stop reason: SDUPTHER

## 2020-06-17 RX ORDER — SODIUM HYPOCHLORITE 1.25 MG/ML
SOLUTION TOPICAL 2 TIMES DAILY
Qty: 2 BOTTLE | Refills: 0 | Status: SHIPPED | OUTPATIENT
Start: 2020-06-17 | End: 2020-06-22

## 2020-06-17 RX ORDER — AMLODIPINE BESYLATE 10 MG/1
10 TABLET ORAL DAILY
Qty: 30 TABLET | Refills: 0 | Status: SHIPPED | OUTPATIENT
Start: 2020-06-18 | End: 2020-07-15 | Stop reason: SDUPTHER

## 2020-06-17 RX ORDER — HYDRALAZINE HYDROCHLORIDE 10 MG/1
10 TABLET, FILM COATED ORAL EVERY 6 HOURS SCHEDULED
Status: DISCONTINUED | OUTPATIENT
Start: 2020-06-17 | End: 2020-06-17

## 2020-06-17 RX ORDER — LABETALOL 200 MG/1
200 TABLET, FILM COATED ORAL EVERY 8 HOURS SCHEDULED
Qty: 60 TABLET | Refills: 0 | Status: SHIPPED | OUTPATIENT
Start: 2020-06-17 | End: 2020-07-06 | Stop reason: SDUPTHER

## 2020-06-17 RX ORDER — INSULIN GLARGINE 100 [IU]/ML
15 INJECTION, SOLUTION SUBCUTANEOUS NIGHTLY
Qty: 1 VIAL | Refills: 0 | Status: SHIPPED | OUTPATIENT
Start: 2020-06-17 | End: 2020-10-08 | Stop reason: SDUPTHER

## 2020-06-17 RX ORDER — SENNA PLUS 8.6 MG/1
1 TABLET ORAL NIGHTLY
Qty: 30 TABLET | Refills: 0 | Status: SHIPPED | OUTPATIENT
Start: 2020-06-17 | End: 2020-07-15 | Stop reason: SDUPTHER

## 2020-06-17 RX ORDER — DOCUSATE SODIUM 100 MG/1
100 CAPSULE, LIQUID FILLED ORAL 2 TIMES DAILY PRN
Qty: 60 CAPSULE | Refills: 0 | Status: SHIPPED | OUTPATIENT
Start: 2020-06-17

## 2020-06-17 RX ORDER — PEN NEEDLE, DIABETIC 29 GAUGE
1 NEEDLE, DISPOSABLE MISCELLANEOUS DAILY
Qty: 100 EACH | Refills: 0 | Status: SHIPPED | OUTPATIENT
Start: 2020-06-17 | End: 2020-07-06 | Stop reason: SDUPTHER

## 2020-06-17 RX ADMIN — LABETALOL HCL 200 MG: 200 TABLET, FILM COATED ORAL at 12:12

## 2020-06-17 RX ADMIN — DAKIN'S SOLUTION 0.125% (QUARTER STRENGTH): 0.12 SOLUTION at 08:55

## 2020-06-17 RX ADMIN — HYDRALAZINE HYDROCHLORIDE 10 MG: 10 TABLET, FILM COATED ORAL at 06:03

## 2020-06-17 RX ADMIN — AMLODIPINE BESYLATE 10 MG: 10 TABLET ORAL at 08:54

## 2020-06-17 RX ADMIN — SPIRONOLACTONE 100 MG: 100 TABLET ORAL at 08:54

## 2020-06-17 RX ADMIN — HYDRALAZINE HYDROCHLORIDE 10 MG: 10 TABLET, FILM COATED ORAL at 02:58

## 2020-06-17 RX ADMIN — ACETAMINOPHEN 650 MG: 325 TABLET ORAL at 06:03

## 2020-06-17 RX ADMIN — INSULIN LISPRO 2 UNITS: 100 INJECTION, SOLUTION INTRAVENOUS; SUBCUTANEOUS at 12:11

## 2020-06-17 RX ADMIN — INSULIN LISPRO 2 UNITS: 100 INJECTION, SOLUTION INTRAVENOUS; SUBCUTANEOUS at 08:57

## 2020-06-17 RX ADMIN — LABETALOL HCL 200 MG: 200 TABLET, FILM COATED ORAL at 06:03

## 2020-06-17 RX ADMIN — SODIUM CHLORIDE, PRESERVATIVE FREE 10 ML: 5 INJECTION INTRAVENOUS at 08:54

## 2020-06-17 ASSESSMENT — PAIN SCALES - GENERAL: PAINLEVEL_OUTOF10: 6

## 2020-06-17 NOTE — PROGRESS NOTES
[facility] Wound Ostomy  Nurse  Follow up Note       NAME:  Ferny Ko RECORD NUMBER:  [id number]  AGE: 64 y.o. GENDER: female  : 1960  TODAY'S DATE:  2020    Subjective   Reason for [address] Nurse Evaluation and Assessment: Chronic non-healing traumatic wound to the left posterior heel. Patient states she was injured by a falling wooden table top in January and did not seek first aid; has been treating at home using Aloe vera.    Presents as painful slough covered full thickness wound with surrounding erythema. Measurements:     20 0957   Wound 06/15/20 Heel Left;Posterior   Date First Assessed/Time First Assessed: 06/15/20 1041   Present on Hospital Admission: Yes  Wound Approximate Age at First Assessment (Weeks): (c)   Primary Wound Type: Traumatic  Location: Heel  Wound Location Orientation: Left;Posterior   Wound Image    Wound Traumatic   Dressing Status Changed   Dressing Changed Changed/New   Dressing/Treatment Moist to moist;Pharmaceutical agent (see MAR)  (Dakins)   Wound Cleansed Wound cleanser   Dressing Change Due 20  (evening)   Wound Assessment Pink;Yellow;Slough   Drainage Amount Small   Drainage Description Yellow   Pennie-wound Assessment Pink  (scarring)   Non-staged Wound Description Full thickness   Pink%Wound Bed 50   Yellow%Wound Bed 50       Response to treatment:  Well tolerated by patient. Reports less pain to touch. Plan   Plan of Care: Wound 06/15/20 Heel Left;Posterior-Dressing/Treatment: (P) Moist to moist, Pharmaceutical agent (see MAR)(Dakins)   Continue BID moist to moist dressings using Dakins. Current Diet: DIET CARB CONTROL; Dietary Nutrition Supplements: Wound Healing Oral Supplement      Discharge Plan:  Placement for patient upon discharge: home with support    Patient appropriate for Outpatient [address]: Yes as needed. Discussed continued dressing changes at home using the Dakins solution for up to 10 days.   Gauze

## 2020-06-17 NOTE — PROGRESS NOTES
CLINICAL PHARMACY NOTE: MEDS TO [address] Select Patient?: No  Total # of Prescriptions Filled: 9   The following medications were delivered to the patient:  · Amlodipine  · Atorvastatin  · Labetalol  · Colace  · Senna  · dakins  · Pen needles  · lantus  · humalog  Total # of Interventions Completed: 0  Time Spent (min): 5    Additional Documentation: meds delivered to patient 6/17 at 4:35pm

## 2020-06-17 NOTE — PROGRESS NOTES
Deltoid, biceps, triceps, wrist flexion, wrist extension             Hip flexion/extension, knee flexion/extension, plantar flexion  Bulk: grossly normal no atrophy  Tone: symmetric b/l arms and legs  Abnormal movements: No abnormal movements or tremor   Sensory function Symmetric to touch in all extremities bilaterally   Cerebellar No dysmetria or dysdiadochocinesia    Reflex function DTR:        2+ b/l symmetric in biceps, brachioradialis, patellar, calcaneal  Babinski b/l plantar downgoing   Gait                  Not assessed     DRAINS:  [x] There are no drains for Neuro Critical Care to monitor at this time. No data recorded      ICH SCORE  (Estimate 30 Day Mortality)   POINTS   Age    < 80  ? 80     [x] 0  [] 1   GCS:    13-15                                 5-12                                   3-4         [x] 0  [] 1  [] 2   ICH Volume     < 30 mL (cc)                      ? 30 mL (cc)      [x] 0  [] 1   IVH      No                                     Yes      [x] 0  [] 1   Location    Supratentorial Origin                   Infratentorial Origin     [] 0  [x] 1   TOTAL 1   0= 10%             1=13%           2= 26%              3=72%             4 = 97%         5 & 6 = 100%    ASSESSMENT AND PLAN:       61y/o F h/o untreated HTN, untreated T2DM presented to olf 6/11 2/2 a headache, slurred speech, difficulty using her right arm/walking. LKW 12:30 am night prior. NIHSS 8. 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 , EF: 56%   -outpatient sleep study  - Goal SBP <140  - Neuro checks per protocol  - F/u A1C, LDL, thrombotic stroke prevention given her underlying risk    CARDIOVASCULAR:  - Goal SBP <140  - starting labetalol

## 2020-06-17 NOTE — PROGRESS NOTES
ENZYMES:   No results for input(s): CKMB, CKMBINDEX, TROPONINT in the last 72 hours. Invalid input(s): CKTOTAL;3  FASTING LIPID PANEL:  Lab Results   Component Value Date    CHOL 163 06/12/2020    HDL 33 (L) 06/12/2020    TRIG 277 (H) 06/12/2020     LIVER PROFILE: No results for input(s): AST, ALT, ALB, BILIDIR, BILITOT, ALKPHOS in the last 72 hours. I/O (24Hr): No intake or output data in the 24 hours ending 06/17/20 0946    Glu last 24 hour  Recent Labs     06/16/20  1142 06/16/20  1548 06/16/20  2037 06/17/20  0750   POCGLU 198* 202* 149* 159*       No results for input(s): CLARITYU, COLORU, PHUR, SPECGRAV, PROTEINU, RBCUA, BLOODU, BACTERIA, NITRU, WBCUA, LEUKOCYTESUR, YEAST, GLUCOSEU, BILIRUBINUR in the last 72 hours. Impression: Ms. Seth Tyler is a 64 y.o. male with a history of <principal problem not specified>     1. Intraparenchymal hemorrhage  2. Exposure hepatitis C/elevated liver enzymes  3. Hypertension hyperlipidemia-Norvasc, Lipitor,   4. History of depression  5. Diabetes-on insulin  6. Acute renal failure- improved  7. Hypertension-Norvasc spironolactone hydralazine  8. Wound care left posterior heel     Recommendations:  1. Diagnosis: Intraparenchymal hemorrhage  2. Therapy:  Minimal PT needs-ambulate 300 feet standby assist with rolling walker, steady with rolling walker but unsteady without assistive device, with OT needs mod/max assist lower extremity dressing some speech needs-dysarthria  3. Medical  Necessity: As above  4. Support: Clarify? Son  5. Rehab recommendation: ambulate 300 feet standby assist would benefit from PT to work on ambulation without assistive device, has OT needs, some speech needs for dysarthria- could consider home with assist versus short-term inpatient rehab if minimal support to improve to modified independent level. 6. DVT proph: Lovenox      Please call with questions. Kat Campa. Ko Redmond MD       This note is created with the assistance of a speech recognition program.  While intending to generate a document that actually reflects the content of the visit, the document can still have some errors including those of syntax and sound a like substitutions which may escape proof reading.   In such instances, actual meaning can be extrapolated by contextual diversion

## 2020-06-17 NOTE — PLAN OF CARE
Problem: Falls - Risk of:  Goal: Will remain free from falls  Description: Will remain free from falls  6/17/2020 1316 by Claudeen Groom, RN  Outcome: Ongoing  6/17/2020 0436 by Jhonatan Hubbard RN  Outcome: Ongoing  Goal: Absence of physical injury  Description: Absence of physical injury  6/17/2020 1316 by Claudeen Groom, RN  Outcome: Ongoing  6/17/2020 0436 by Jhonatan Hubbard RN  Outcome: Ongoing

## 2020-06-17 NOTE — PROGRESS NOTES
[facility]  Speech Language Pathology    Date: 6/17/2020  Patient Name: Carmelina Huynh  YOB: 1960   AGE: 64 y.o. MRN: [id number]        Patient Not Available for Speech Therapy     Due to:  [] Testing  [] Hemodialysis  [] Cancelled by RN  [] Surgery   [] Intubation/Sedation/Pain Medication  [] Medical instability  [x] Other:  With PT    Next scheduled treatment: as able  Completed by:  Andriy Barrera M.S. [facility]

## 2020-06-17 NOTE — PROGRESS NOTES
Physical Therapy  Facility/Department: [facility] NEURO  Daily Treatment Note  NAME: Guerline Mcdaniel  : 1960  MRN: [id number]    Date of Service: 2020    Discharge Recommendations:  Patient would benefit from continued therapy after discharge        Assessment   Body structures, Functions, Activity limitations: Decreased functional mobility ; Decreased strength;Decreased balance;Decreased fine motor control  PT Education: Goals;Transfer Training;Gait Training;Functional Mobility Training;General Safety  Activity Tolerance  Activity Tolerance: Patient Tolerated treatment well     Patient Diagnosis(es): The encounter diagnosis was Intracranial hemorrhage ([facility] [address] has a past medical history of Chronic headache, Chronic headache, Elevated liver enzymes, Esophageal reflux, Exposure to hepatitis C, Hepatitis C, Hypertension, Major depression, and Major depression. has no past surgical history on file. Restrictions  Restrictions/Precautions  Restrictions/Precautions: Up as Tolerated  Required Braces or Orthoses?: No  Position Activity Restriction  Other position/activity restrictions: Up with assistance   Subjective   Pt up in chair. Pt would like to go home now. Pt plans to go to [facility]. Orientation   WNL  Cognition   Cognition  Overall Cognitive Status: WNL  Objective      Transfers  Sit to Stand: Stand by assistance  Stand to sit: Stand by assistance  Ambulation  Ambulation?: Yes  Ambulation 1  Surface: level tile  Device: Rolling Walker  Assistance: Stand by assistance  Quality of Gait: Steady with rolling walker  Gait Deviations: None  Distance: 300 ft  Comments: Attempted ambulation without device but pt was unsteady. RUE with 1.5 lbs x 15 reps  RLE with 3 lb x 15 reps  Step ups BLE 6 inch step x 10 reps. One UE support.      Balance  Posture: Good  Sitting - Static: Good  Sitting - Dynamic: Good  Standing - Static: Good  Standing - Dynamic: Good;-        Goals  Short term goals  Time Frame

## 2020-06-22 ENCOUNTER — HOSPITAL ENCOUNTER (OUTPATIENT)
Dept: WOUND CARE | Age: 60
Discharge: HOME | End: 2020-06-22
Payer: MEDICAID

## 2020-06-22 VITALS
WEIGHT: 254 LBS | HEIGHT: 67 IN | TEMPERATURE: 98.9 F | RESPIRATION RATE: 18 BRPM | DIASTOLIC BLOOD PRESSURE: 92 MMHG | HEART RATE: 61 BPM | BODY MASS INDEX: 39.87 KG/M2 | SYSTOLIC BLOOD PRESSURE: 159 MMHG

## 2020-06-22 PROBLEM — S91.002A OPEN WOUND OF LEFT ANKLE: Status: ACTIVE | Noted: 2020-06-22

## 2020-06-22 PROBLEM — E66.9 CLASS 2 OBESITY IN ADULT: Status: ACTIVE | Noted: 2019-09-08

## 2020-06-22 PROBLEM — M17.0 PRIMARY OSTEOARTHRITIS OF BOTH KNEES: Status: ACTIVE | Noted: 2019-11-07

## 2020-06-22 PROBLEM — I16.0 HYPERTENSIVE URGENCY: Status: ACTIVE | Noted: 2019-09-08

## 2020-06-22 PROBLEM — E11.65 TYPE 2 DIABETES MELLITUS WITH HYPERGLYCEMIA, WITH LONG-TERM CURRENT USE OF INSULIN (HCC): Status: ACTIVE | Noted: 2019-09-11

## 2020-06-22 PROBLEM — R10.31 RIGHT LOWER QUADRANT ABDOMINAL PAIN: Status: ACTIVE | Noted: 2019-09-09

## 2020-06-22 PROBLEM — T79.9XXS TRAUMA COMPLICATION, LATE EFFECT: Status: ACTIVE | Noted: 2020-06-22

## 2020-06-22 PROBLEM — N28.9 RENAL IMPAIRMENT: Status: ACTIVE | Noted: 2019-11-07

## 2020-06-22 PROBLEM — R76.8 ANA POSITIVE: Status: ACTIVE | Noted: 2019-11-07

## 2020-06-22 PROBLEM — G93.2: Status: ACTIVE | Noted: 2020-06-22

## 2020-06-22 PROBLEM — D25.9 UTERINE LEIOMYOMA: Status: ACTIVE | Noted: 2019-09-09

## 2020-06-22 PROBLEM — E66.01 CLASS 2 SEVERE OBESITY WITH SERIOUS COMORBIDITY AND BODY MASS INDEX (BMI) OF 39.0 TO 39.9 IN ADULT (HCC): Status: ACTIVE | Noted: 2019-09-08

## 2020-06-22 PROBLEM — Z79.4 TYPE 2 DIABETES MELLITUS WITH HYPERGLYCEMIA, WITH LONG-TERM CURRENT USE OF INSULIN (HCC): Status: ACTIVE | Noted: 2019-09-11

## 2020-06-22 PROBLEM — E11.9 NEW ONSET TYPE 2 DIABETES MELLITUS (HCC): Status: ACTIVE | Noted: 2019-09-11

## 2020-06-22 PROCEDURE — 87075 CULTR BACTERIA EXCEPT BLOOD: CPT

## 2020-06-22 PROCEDURE — 99203 OFFICE O/P NEW LOW 30 MIN: CPT | Performed by: NURSE PRACTITIONER

## 2020-06-22 PROCEDURE — 99213 OFFICE O/P EST LOW 20 MIN: CPT

## 2020-06-22 PROCEDURE — 6370000000 HC RX 637 (ALT 250 FOR IP): Performed by: NURSE PRACTITIONER

## 2020-06-22 PROCEDURE — 87070 CULTURE OTHR SPECIMN AEROBIC: CPT

## 2020-06-22 PROCEDURE — 11042 DBRDMT SUBQ TIS 1ST 20SQCM/<: CPT | Performed by: NURSE PRACTITIONER

## 2020-06-22 PROCEDURE — 87205 SMEAR GRAM STAIN: CPT

## 2020-06-22 PROCEDURE — 11042 DBRDMT SUBQ TIS 1ST 20SQCM/<: CPT

## 2020-06-22 PROCEDURE — 86403 PARTICLE AGGLUT ANTBDY SCRN: CPT

## 2020-06-22 PROCEDURE — 87186 SC STD MICRODIL/AGAR DIL: CPT

## 2020-06-22 PROCEDURE — 87176 TISSUE HOMOGENIZATION CULTR: CPT

## 2020-06-22 RX ORDER — LIDOCAINE HYDROCHLORIDE 40 MG/ML
SOLUTION TOPICAL ONCE
Status: COMPLETED | OUTPATIENT
Start: 2020-06-22 | End: 2020-06-22

## 2020-06-22 RX ADMIN — LIDOCAINE HYDROCHLORIDE 10 ML: 40 SOLUTION TOPICAL at 15:16

## 2020-06-22 ASSESSMENT — PAIN SCALES - GENERAL: PAINLEVEL_OUTOF10: 0

## 2020-06-22 NOTE — PROGRESS NOTES
Temp 98.9 °F (37.2 °C) (Tympanic)   Resp 18   Ht 5' 7\" (1.702 m)   Wt 254 lb (115.2 kg)   LMP 11/09/2014   BMI 39.78 kg/m²     Wt Readings from Last 3 Encounters:   06/22/20 254 lb (115.2 kg)   06/11/20 254 lb 10.1 oz (115.5 kg)   06/11/20 264 lb (119.7 kg)       PHYSICAL EXAM    General Appearance: alert and oriented to person, place and time, well-developed and obese, in no acute distress  Skin: warm and dry, no rash or erythema, left posterior ankle wound   Head: normocephalic and atraumatic  Eyes: pupils equal, round, and reactive to light, extraocular eye movements intact, conjunctivae normal  Pulmonary/Chest: clear to auscultation bilaterally- no wheezes, rales or rhonchi, normal air movement, no respiratory distress  Cardiology: regular rate, rhythm   Extremities: no cyanosis and no clubbing  Musculoskeletal: no joint swelling, deformity or tenderness  Neurologic: gait, coordination normal and speech normal      Assessment:     Problem List Items Addressed This Visit     Type 2 diabetes mellitus with hyperglycemia, with long-term current use of insulin (HCC) - Primary    Class 2 severe obesity with serious comorbidity and body mass index (BMI) of 39.0 to 39.9 in adult [facility])    Open wound of left ankle    Trauma complication, late effect           Procedure Note  Indications:  Based on my examination of this patient's wound(s)/ulcer(s) today, debridement is required to promote healing and evaluate the wound base. Performed by: ADALID Mera - CNP    Consent obtained:  Yes    Time out taken:  Yes    Pain Control: Anesthetic  Anesthetic: 4% Lidocaine Liquid Topical       Debridement:Excisional Debridement    Using curette, scissors and forceps the wound(s)/ulcer(s) was/were sharply debrided down through and including the removal of subcutaneous tissue.         Devitalized Tissue Debrided:  fibrin, biofilm and slough    Pre Debridement Measurements:  Are located in the [address]  Documentation

## 2020-06-22 NOTE — PLAN OF CARE
Problem: HEMODYNAMIC STATUS  Goal: Patient has stable vital signs and fluid balance  Outcome: Completed     Problem: ACTIVITY INTOLERANCE/IMPAIRED MOBILITY  Goal: Mobility/activity is maintained at optimum level for patient  Outcome: Completed     Problem: COMMUNICATION IMPAIRMENT  Goal: Ability to express needs and understand communication  Outcome: Completed     Problem: Pain:  Goal: Pain level will decrease  Description: Pain level will decrease  Outcome: Completed  Goal: Control of acute pain  Description: Control of acute pain  Outcome: Completed  Goal: Control of chronic pain  Description: Control of chronic pain  Outcome: Completed     Problem: Neurological  Goal: Maximum potential motor/sensory/cognitive function  Outcome: Completed     Problem: Nutrition  Goal: Optimal nutrition therapy  Description: Nutrition Problem: Increased nutrient needs  Intervention: Food and/or Nutrient Delivery: Continue current diet, Start ONS  Nutritional Goals: Meet greater than 50% of estimated nutrient needs with intake of therapeutic diet.    Outcome: Completed     Problem: Falls - Risk of:  Goal: Will remain free from falls  Description: Will remain free from falls  Outcome: Completed  Goal: Absence of physical injury  Description: Absence of physical injury  Outcome: Completed

## 2020-06-24 LAB
CULTURE: ABNORMAL
CULTURE: ABNORMAL
DIRECT EXAM: ABNORMAL
DIRECT EXAM: ABNORMAL
Lab: ABNORMAL
SPECIMEN DESCRIPTION: ABNORMAL

## 2020-06-25 ENCOUNTER — TELEPHONE (OUTPATIENT)
Dept: WOUND CARE | Age: 60
End: 2020-06-25

## 2020-06-25 RX ORDER — DOXYCYCLINE HYCLATE 100 MG
100 TABLET ORAL 2 TIMES DAILY
Qty: 20 TABLET | Refills: 0 | Status: SHIPPED | OUTPATIENT
Start: 2020-06-25 | End: 2020-07-05

## 2020-06-29 ENCOUNTER — HOSPITAL ENCOUNTER (OUTPATIENT)
Dept: WOUND CARE | Age: 60
Discharge: HOME | End: 2020-06-29
Payer: MEDICAID

## 2020-06-29 VITALS
BODY MASS INDEX: 39.87 KG/M2 | RESPIRATION RATE: 18 BRPM | WEIGHT: 254 LBS | SYSTOLIC BLOOD PRESSURE: 151 MMHG | TEMPERATURE: 97.6 F | DIASTOLIC BLOOD PRESSURE: 93 MMHG | HEART RATE: 65 BPM | HEIGHT: 67 IN

## 2020-06-29 PROCEDURE — 11042 DBRDMT SUBQ TIS 1ST 20SQCM/<: CPT | Performed by: NURSE PRACTITIONER

## 2020-06-29 PROCEDURE — 11042 DBRDMT SUBQ TIS 1ST 20SQCM/<: CPT

## 2020-06-29 RX ORDER — LIDOCAINE 40 MG/G
CREAM TOPICAL ONCE
Status: CANCELLED | OUTPATIENT
Start: 2020-06-29

## 2020-06-29 RX ORDER — LIDOCAINE 50 MG/G
OINTMENT TOPICAL ONCE
Status: CANCELLED | OUTPATIENT
Start: 2020-06-29

## 2020-06-29 RX ORDER — LIDOCAINE HYDROCHLORIDE 40 MG/ML
SOLUTION TOPICAL ONCE
Status: CANCELLED | OUTPATIENT
Start: 2020-06-29

## 2020-06-29 ASSESSMENT — PAIN DESCRIPTION - DESCRIPTORS: DESCRIPTORS: ACHING;CONSTANT;DISCOMFORT

## 2020-06-29 ASSESSMENT — PAIN DESCRIPTION - ORIENTATION: ORIENTATION: LEFT

## 2020-06-29 ASSESSMENT — PAIN DESCRIPTION - PROGRESSION: CLINICAL_PROGRESSION: GRADUALLY IMPROVING

## 2020-06-29 ASSESSMENT — PAIN SCALES - GENERAL: PAINLEVEL_OUTOF10: 4

## 2020-06-29 ASSESSMENT — PAIN DESCRIPTION - ONSET: ONSET: ON-GOING

## 2020-06-29 ASSESSMENT — PAIN DESCRIPTION - PAIN TYPE: TYPE: CHRONIC PAIN

## 2020-06-29 ASSESSMENT — PAIN DESCRIPTION - FREQUENCY: FREQUENCY: INTERMITTENT

## 2020-06-29 NOTE — PROGRESS NOTES
[address]   Progress Note and Procedure Note      Petra Herrera  MEDICAL RECORD NUMBER:  [id number]  AGE: 64 y.o. GENDER: female  : 1960  EPISODE DATE:  2020    Subjective:     Chief Complaint   Patient presents with    Wound Check     left lower leg posterior         HISTORY of PRESENT ILLNESS HPI     Petra Herrera is a 64 y.o. female who presents today for wound/ulcer evaluation.    History of Wound Context: left posterior ankle wound after table fell on her leg in 2020  Wound/Ulcer Pain Timing/Severity: intermittent  Quality of pain: sharp  Severity:  2 / 10   Modifying Factors: Pain worsens with debridement  Associated Signs/Symptoms: pain    Ulcer Identification:  Ulcer Type: traumatic  Contributing Factors: diabetes, poor glucose control, decreased mobility and obesity    Wound: N/A        PAST MEDICAL HISTORY        Diagnosis Date    Arthritis     Cerebral artery occlusion with cerebral infarction (HCC)     Chronic headache     Chronic headache     Diabetes mellitus (HCC)     Elevated liver enzymes     Esophageal reflux     Exposure to hepatitis C     Hepatitis C     1a or 1b genotype    Hypertension     Major depression     Major depression     Renal impairment 2019       PAST SURGICAL HISTORY    Past Surgical History:   Procedure Laterality Date    EYE SURGERY         FAMILY HISTORY    Family History   Problem Relation Age of Onset    High Blood Pressure Mother     Cancer Sister         lymphoma       SOCIAL HISTORY    Social History     Tobacco Use    Smoking status: Former Smoker     Packs/day: 0.50     Years: 35.00     Pack years: 17.50     Types: Cigarettes     Last attempt to quit: 2019     Years since quittin.4    Smokeless tobacco: Never Used   Substance Use Topics    Alcohol use: No    Drug use: No       ALLERGIES    Allergies   Allergen Reactions    Bactrim [Sulfamethoxazole-Trimethoprim]

## 2020-07-01 ENCOUNTER — OFFICE VISIT (OUTPATIENT)
Dept: FAMILY MEDICINE CLINIC | Age: 60
End: 2020-07-01
Payer: MEDICAID

## 2020-07-01 VITALS
BODY MASS INDEX: 41.04 KG/M2 | HEART RATE: 64 BPM | SYSTOLIC BLOOD PRESSURE: 135 MMHG | DIASTOLIC BLOOD PRESSURE: 87 MMHG | WEIGHT: 262 LBS | TEMPERATURE: 97.2 F

## 2020-07-01 PROCEDURE — G8427 DOCREV CUR MEDS BY ELIG CLIN: HCPCS | Performed by: STUDENT IN AN ORGANIZED HEALTH CARE EDUCATION/TRAINING PROGRAM

## 2020-07-01 PROCEDURE — 99203 OFFICE O/P NEW LOW 30 MIN: CPT | Performed by: STUDENT IN AN ORGANIZED HEALTH CARE EDUCATION/TRAINING PROGRAM

## 2020-07-01 PROCEDURE — G8417 CALC BMI ABV UP PARAM F/U: HCPCS | Performed by: STUDENT IN AN ORGANIZED HEALTH CARE EDUCATION/TRAINING PROGRAM

## 2020-07-01 PROCEDURE — 1036F TOBACCO NON-USER: CPT | Performed by: STUDENT IN AN ORGANIZED HEALTH CARE EDUCATION/TRAINING PROGRAM

## 2020-07-01 PROCEDURE — 1111F DSCHRG MED/CURRENT MED MERGE: CPT | Performed by: STUDENT IN AN ORGANIZED HEALTH CARE EDUCATION/TRAINING PROGRAM

## 2020-07-01 PROCEDURE — 3017F COLORECTAL CA SCREEN DOC REV: CPT | Performed by: STUDENT IN AN ORGANIZED HEALTH CARE EDUCATION/TRAINING PROGRAM

## 2020-07-01 RX ORDER — DULOXETIN HYDROCHLORIDE 20 MG/1
20 CAPSULE, DELAYED RELEASE ORAL DAILY
Qty: 30 CAPSULE | Refills: 3 | Status: SHIPPED | OUTPATIENT
Start: 2020-07-01 | End: 2020-07-01

## 2020-07-01 RX ORDER — DULOXETIN HYDROCHLORIDE 60 MG/1
60 CAPSULE, DELAYED RELEASE ORAL DAILY
Qty: 30 CAPSULE | Refills: 3 | Status: SHIPPED | OUTPATIENT
Start: 2020-07-01

## 2020-07-01 NOTE — PROGRESS NOTES
Visit Information    Have you changed or started any medications since your last visit including any over-the-counter medicines, vitamins, or herbal medicines? no   Have you stopped taking any of your medications? Is so, why? -  no  Are you having any side effects from any of your medications? - no    Have you seen any other physician or provider since your last visit?  no   Have you had any other diagnostic tests since your last visit? yes - blood work, ct head, mri   Have you been seen in the emergency room and/or had an admission in a hospital since we last saw you?  yes - stroke. [facility]   Have you had your routine dental cleaning in the past 6 months?  no     Do you have an active MyChart account? If no, what is the barrier?   No: pending    Patient Care Team:  Jalen May MD as Consulting Physician (Gastroenterology)    Medical History Review  Past Medical, Family, and Social History reviewed and does contribute to the patient presenting condition    Health Maintenance   Topic Date Due    Pneumococcal 0-64 years Vaccine (1 of 1 - PPSV23) 02/15/1968    Diabetic foot exam  02/15/1972    Diabetic retinal exam  02/15/1972    HIV screen  02/15/1977    Diabetic microalbuminuria test  02/15/1980    DTaP/Tdap/Td vaccine (1 - Tdap) 02/15/1981    Cervical cancer screen  02/15/1983    Breast cancer screen  02/15/2012    Shingles Vaccine (1 of 2) 02/15/2012    Colon cancer screen colonoscopy  02/15/2012    Hepatitis B vaccine (3 of 3 - Risk 3-dose series) 07/22/2017    Flu vaccine (1) 09/01/2020    A1C test (Diabetic or Prediabetic)  09/12/2020    Lipid screen  06/12/2021    Potassium monitoring  06/17/2021    Creatinine monitoring  06/17/2021    Hepatitis A vaccine  Aged Out    Hib vaccine  Aged Out    Meningococcal (ACWY) vaccine  Aged Out

## 2020-07-01 NOTE — PROGRESS NOTES
Attending Physician Statement    Wt Readings from Last 3 Encounters:   07/01/20 262 lb (118.8 kg)   06/29/20 254 lb (115.2 kg)   06/22/20 254 lb (115.2 kg)     Temp Readings from Last 3 Encounters:   07/01/20 97.2 °F (36.2 °C)   06/29/20 97.6 °F (36.4 °C) (Tympanic)   06/22/20 98.9 °F (37.2 °C) (Tympanic)     BP Readings from Last 3 Encounters:   07/01/20 135/87   06/29/20 (!) 151/93   06/22/20 (!) 159/92     Pulse Readings from Last 3 Encounters:   07/01/20 64   06/29/20 65   06/22/20 61         I have discussed the care of Abrandarien Jj, including pertinent history and exam findings with the resident. I have reviewed the key elements of all parts of the encounter with the resident. I agree with the assessment, plan and orders as documented by the resident.   (GE Modifier)

## 2020-07-01 NOTE — PROGRESS NOTES
Post-Discharge Transitional Care Management Services or Hospital Follow Up      Kareem Steward   YOB: 1960    Date of Office Visit:  7/1/2020  Date of Hospital Admission: 6/11/20  Date of Hospital Discharge: 6/17/20  Risk of hospital readmission (high >=14%.  Medium >=10%) :Readmission Risk Score: 15      Care management risk score Rising risk (score 2-5) and Complex Care (Scores >=6): 1     Non face to face  following discharge, date last encounter closed (first attempt may have been earlier): *No documented post hospital discharge outreach found in the last 14 days    Call initiated 2 business days of discharge: *No response recorded in the last 14 days    Patient Active Problem List   Diagnosis    Major depression    Exposure to hepatitis C    Chronic headache    Hypertension    Esophageal reflux    Hepatitis C    Elevated liver enzymes    Intracranial hemorrhage (HCC)    Pontine hemorrhage (HCC)    History of diabetes mellitus    History of hypertension    Type 2 diabetes mellitus with hyperglycemia, with long-term current use of insulin (HCC)    Primary osteoarthritis of both knees    Raised intracranial pressure    Renal impairment    Right lower quadrant abdominal pain    Uterine leiomyoma    Hypertensive urgency    Hypertensive heart disease without heart failure    Class 2 severe obesity with serious comorbidity and body mass index (BMI) of 39.0 to 39.9 in adult (HCC)    NANDA positive    Abnormal electrocardiogram    Open wound of left ankle    Trauma complication, late effect       Allergies   Allergen Reactions    Bactrim [Sulfamethoxazole-Trimethoprim]        Medications listed as ordered at the time of discharge from hospital   Gulshan Aaron   [facility] Medication Instructions HZO:[id number]    Printed on:07/01/20 6169   Medication Information                      amLODIPine (NORVASC) 10 MG tablet  Take 1 tablet by mouth daily             atorvastatin (LIPITOR) 10 MG tablet  Take 1 tablet by mouth nightly             blood glucose monitor strips  Test 3  times a day & as needed for symptoms of irregular blood glucose. Dispense sufficient amount for indicated testing frequency plus additional to accommodate PRN testing needs.              docusate sodium (COLACE) 100 MG capsule  Take 1 capsule by mouth 2 times daily as needed for Constipation             doxycycline hyclate (VIBRA-TABS) 100 MG tablet  Take 1 tablet by mouth 2 times daily for 10 days             DULoxetine (CYMBALTA) 60 MG extended release capsule  Take 1 capsule by mouth daily             glucose monitoring kit (FREESTYLE) monitoring kit  1 kit by Does not apply route daily             insulin glargine (LANTUS) 100 UNIT/ML injection vial  Inject 15 Units into the skin nightly             insulin lispro (HUMALOG) 100 UNIT/ML injection vial  Inject 2 Units into the skin 3 times daily (before meals)             Insulin Pen Needle ([employer] PEN NEEDLES 29G) 29G X 12MM MISC  1 each by Does not apply route daily             labetalol (NORMODYNE) 200 MG tablet  Take 1 tablet by mouth every 8 hours             senna (SENOKOT) 8.6 MG tablet  Take 1 tablet by mouth nightly             spironolactone (ALDACTONE) 50 MG tablet  Take 50 mg by mouth daily             tiZANidine (ZANAFLEX) 4 MG tablet  Take 4 mg by mouth nightly                   Medications marked \"taking\" at this time  Outpatient Medications Marked as Taking for the 7/1/20 encounter (Office Visit) with Al Hinton MD   Medication Sig Dispense Refill    DULoxetine (CYMBALTA) 60 MG extended release capsule Take 1 capsule by mouth daily 30 capsule 3    doxycycline hyclate (VIBRA-TABS) 100 MG tablet Take 1 tablet by mouth 2 times daily for 10 days 20 tablet 0    amLODIPine (NORVASC) 10 MG tablet Take 1 tablet by mouth daily 30 tablet 0    atorvastatin (LIPITOR) 10 MG tablet Take 1 tablet by mouth nightly 30 tablet 3    labetalol (NORMODYNE) 200 MG tablet

## 2020-07-06 RX ORDER — GLUCOSAMINE HCL/CHONDROITIN SU 500-400 MG
CAPSULE ORAL
Qty: 30 STRIP | Refills: 0 | Status: SHIPPED | OUTPATIENT
Start: 2020-07-06

## 2020-07-06 RX ORDER — LABETALOL 200 MG/1
200 TABLET, FILM COATED ORAL EVERY 8 HOURS SCHEDULED
Qty: 60 TABLET | Refills: 0 | Status: SHIPPED | OUTPATIENT
Start: 2020-07-06 | End: 2020-07-30

## 2020-07-06 RX ORDER — PEN NEEDLE, DIABETIC 29 GAUGE
1 NEEDLE, DISPOSABLE MISCELLANEOUS DAILY
Qty: 100 EACH | Refills: 0 | Status: SHIPPED | OUTPATIENT
Start: 2020-07-06 | End: 2020-10-08 | Stop reason: SDUPTHER

## 2020-07-07 ENCOUNTER — TELEPHONE (OUTPATIENT)
Dept: WOUND CARE | Age: 60
End: 2020-07-07

## 2020-07-13 ENCOUNTER — HOSPITAL ENCOUNTER (OUTPATIENT)
Dept: WOUND CARE | Age: 60
Discharge: HOME | End: 2020-07-13

## 2020-07-15 RX ORDER — ATORVASTATIN CALCIUM 10 MG/1
10 TABLET, FILM COATED ORAL NIGHTLY
Qty: 30 TABLET | Refills: 3 | Status: SHIPPED | OUTPATIENT
Start: 2020-07-15

## 2020-07-15 RX ORDER — SENNA PLUS 8.6 MG/1
1 TABLET ORAL NIGHTLY
Qty: 30 TABLET | Refills: 0 | Status: SHIPPED | OUTPATIENT
Start: 2020-07-15 | End: 2020-08-14

## 2020-07-15 RX ORDER — AMLODIPINE BESYLATE 10 MG/1
10 TABLET ORAL DAILY
Qty: 30 TABLET | Refills: 0 | Status: SHIPPED | OUTPATIENT
Start: 2020-07-15

## 2020-07-15 NOTE — TELEPHONE ENCOUNTER
Last visit: 07/01/2020  Last Med refill:   Does patient have enough medication for 72 hours: No:     Next Visit Date:  Future Appointments   Date Time Provider Mervin Kasper   7/21/2020  3:00 PM Miguel Ayala MD [id number]   7/27/2020  3:00  Kaylie Bernardo MD [address] Maintenance   Topic Date Due    Pneumococcal 0-64 years Vaccine (1 of 1 - PPSV23) 02/15/1968    Diabetic foot exam  02/15/1972    Diabetic retinal exam  02/15/1972    HIV screen  02/15/1977    Diabetic microalbuminuria test  02/15/1980    DTaP/Tdap/Td vaccine (1 - Tdap) 02/15/1981    Cervical cancer screen  02/15/1983    Breast cancer screen  02/15/2012    Shingles Vaccine (1 of 2) 02/15/2012    Colon cancer screen colonoscopy  02/15/2012    Hepatitis B vaccine (3 of 3 - Risk 3-dose series) 07/22/2017    Flu vaccine (1) 09/01/2020    A1C test (Diabetic or Prediabetic)  09/12/2020    Lipid screen  06/12/2021    Potassium monitoring  06/17/2021    Creatinine monitoring  06/17/2021    Hepatitis A vaccine  Aged Out    Hib vaccine  Aged Out    Meningococcal (ACWY) vaccine  Aged Out       Hemoglobin A1C (%)   Date Value   06/12/2020 9.3 (H)             ( goal A1C is < 7)   No results found for: LABMICR  LDL Cholesterol (mg/dL)   Date Value   06/12/2020 75       (goal LDL is <100)   AST (U/L)   Date Value   06/11/2020 29     ALT (U/L)   Date Value   06/11/2020 45 (H)     BUN (mg/dL)   Date Value   06/17/2020 24 (H)     BP Readings from Last 3 Encounters:   07/01/20 135/87   06/29/20 (!) 151/93   06/22/20 (!) 159/92          (goal 120/80)    All Future Testing planned in [facility]  Lab Frequency Next Occurrence   [facility]RE Digital Screen Bilateral [[id number]] Once 07/31/2020               Patient Active Problem List:     Major depression     Exposure to hepatitis C     Chronic headache     Hypertension     Esophageal reflux     Hepatitis C     Elevated liver enzymes     Intracranial hemorrhage (HCC)

## 2020-07-21 ENCOUNTER — OFFICE VISIT (OUTPATIENT)
Dept: FAMILY MEDICINE CLINIC | Age: 60
End: 2020-07-21
Payer: MEDICAID

## 2020-07-21 ENCOUNTER — HOSPITAL ENCOUNTER (OUTPATIENT)
Age: 60
Setting detail: SPECIMEN
Discharge: HOME | End: 2020-07-21

## 2020-07-21 VITALS
BODY MASS INDEX: 41 KG/M2 | TEMPERATURE: 97.1 F | HEART RATE: 55 BPM | SYSTOLIC BLOOD PRESSURE: 130 MMHG | WEIGHT: 261.8 LBS | DIASTOLIC BLOOD PRESSURE: 80 MMHG

## 2020-07-21 PROBLEM — Z23 NEED FOR PROPHYLACTIC VACCINATION AGAINST DIPHTHERIA-TETANUS-PERTUSSIS (DTP): Status: ACTIVE | Noted: 2020-07-21

## 2020-07-21 PROBLEM — R53.83 FATIGUE: Status: ACTIVE | Noted: 2020-07-21

## 2020-07-21 PROBLEM — Z12.31 ENCOUNTER FOR SCREENING MAMMOGRAM FOR BREAST CANCER: Status: ACTIVE | Noted: 2020-07-21

## 2020-07-21 LAB
CREATININE URINE: 215.7 MG/DL (ref 28–217)
MICROALBUMIN/CREAT 24H UR: 127 MG/L
MICROALBUMIN/CREAT UR-RTO: 59 MCG/MG CREAT

## 2020-07-21 PROCEDURE — G8428 CUR MEDS NOT DOCUMENT: HCPCS | Performed by: STUDENT IN AN ORGANIZED HEALTH CARE EDUCATION/TRAINING PROGRAM

## 2020-07-21 PROCEDURE — 2022F DILAT RTA XM EVC RTNOPTHY: CPT | Performed by: STUDENT IN AN ORGANIZED HEALTH CARE EDUCATION/TRAINING PROGRAM

## 2020-07-21 PROCEDURE — 3046F HEMOGLOBIN A1C LEVEL >9.0%: CPT | Performed by: STUDENT IN AN ORGANIZED HEALTH CARE EDUCATION/TRAINING PROGRAM

## 2020-07-21 PROCEDURE — 90715 TDAP VACCINE 7 YRS/> IM: CPT | Performed by: FAMILY MEDICINE

## 2020-07-21 PROCEDURE — G8417 CALC BMI ABV UP PARAM F/U: HCPCS | Performed by: STUDENT IN AN ORGANIZED HEALTH CARE EDUCATION/TRAINING PROGRAM

## 2020-07-21 PROCEDURE — 3017F COLORECTAL CA SCREEN DOC REV: CPT | Performed by: STUDENT IN AN ORGANIZED HEALTH CARE EDUCATION/TRAINING PROGRAM

## 2020-07-21 PROCEDURE — 99213 OFFICE O/P EST LOW 20 MIN: CPT | Performed by: STUDENT IN AN ORGANIZED HEALTH CARE EDUCATION/TRAINING PROGRAM

## 2020-07-21 PROCEDURE — 1036F TOBACCO NON-USER: CPT | Performed by: STUDENT IN AN ORGANIZED HEALTH CARE EDUCATION/TRAINING PROGRAM

## 2020-07-21 ASSESSMENT — ENCOUNTER SYMPTOMS
DIARRHEA: 0
APNEA: 0
ABDOMINAL DISTENTION: 0
CONSTIPATION: 0
COUGH: 0
CHEST TIGHTNESS: 0
WHEEZING: 0
VOMITING: 0
ABDOMINAL PAIN: 0
BACK PAIN: 0
SHORTNESS OF BREATH: 1
NAUSEA: 0

## 2020-07-21 ASSESSMENT — PATIENT HEALTH QUESTIONNAIRE - PHQ9
SUM OF ALL RESPONSES TO PHQ9 QUESTIONS 1 & 2: 0
SUM OF ALL RESPONSES TO PHQ QUESTIONS 1-9: 0
1. LITTLE INTEREST OR PLEASURE IN DOING THINGS: 0
SUM OF ALL RESPONSES TO PHQ QUESTIONS 1-9: 0
2. FEELING DOWN, DEPRESSED OR HOPELESS: 0

## 2020-07-21 NOTE — PROGRESS NOTES
Attending Physician Statement  I have discussed the care of Salty Muñoz, including pertinent history and exam findings,  with the resident. I have reviewed the key elements of all parts of the encounter with the resident. I agree with the assessment, plan and orders as documented by the resident.   (GE Modifier)    Leonardo Delgadillo

## 2020-07-21 NOTE — PROGRESS NOTES
Visit Information    Have you changed or started any medications since your last visit including any over-the-counter medicines, vitamins, or herbal medicines? no   Have you stopped taking any of your medications? Is so, why? -  no  Are you having any side effects from any of your medications? - no    Have you seen any other physician or provider since your last visit?  no   Have you had any other diagnostic tests since your last visit?  no   Have you been seen in the emergency room and/or had an admission in a hospital since we last saw you?  no   Have you had your routine dental cleaning in the past 6 months?  no     Do you have an active MyChart account? If no, what is the barrier?   No:     Patient Care Team:  Chava Dowell MD as PCP - General (Emergency Medicine)  Astrid Webb MD as Consulting Physician (Gastroenterology)    Medical History Review  Past Medical, Family, and Social History reviewed and does not contribute to the patient presenting condition    Health Maintenance   Topic Date Due    Pneumococcal 0-64 years Vaccine (1 of 1 - PPSV23) 02/15/1968    Diabetic foot exam  02/15/1972    Diabetic retinal exam  02/15/1972    HIV screen  02/15/1977    Diabetic microalbuminuria test  02/15/1980    DTaP/Tdap/Td vaccine (1 - Tdap) 02/15/1981    Cervical cancer screen  02/15/1983    Breast cancer screen  02/15/2012    Shingles Vaccine (1 of 2) 02/15/2012    Colon cancer screen colonoscopy  02/15/2012    Hepatitis B vaccine (3 of 3 - Risk 3-dose series) 07/22/2017    Flu vaccine (1) 09/01/2020    A1C test (Diabetic or Prediabetic)  09/12/2020    Lipid screen  06/12/2021    Potassium monitoring  06/17/2021    Creatinine monitoring  06/17/2021    Hepatitis A vaccine  Aged Out    Hib vaccine  Aged Out    Meningococcal (ACWY) vaccine  Aged Out

## 2020-07-21 NOTE — PROGRESS NOTES
Subjective:    Kareem Steward is a 64 y.o. female with  has a past medical history of Arthritis, Cerebral artery occlusion with cerebral infarction [facility]), Chronic headache, Chronic headache, Diabetes mellitus ([facility] [address] Elevated liver enzymes, Esophageal reflux, Exposure to hepatitis C, Hepatitis C, Hypertension, Major depression, Major depression, and Renal impairment. Presented to the office today for:  Chief Complaint   Patient presents with    Fatigue     patient states she can not walk far, can not lift bilateral arm and back pain    Immunizations     patient refused pneumococcal       HPI     Patient is a 72-year-old  female seen in the clinic today for follow-up for fatigue. Patient states she has been extra tired recently, feels that she has weakness of the arms and legs. Patient does state that she has a strong family history of ALS and she has siblings that have  from the disease. Patient's blood pressure is well controlled today, states she had an echo last month which showed an LVEF of 56%. Patient used to use inhalers in the past but has not needed them for about a year. Patient has not followed with neurologist before for weakness. States she knows she needs to lose weight but has not actively work on it yet. Review of Systems   Constitutional: Positive for fatigue. Negative for activity change, appetite change, chills and fever. Respiratory: Positive for shortness of breath. Negative for apnea, cough, chest tightness and wheezing. Cardiovascular: Negative for chest pain, palpitations and leg swelling. Gastrointestinal: Negative for abdominal distention, abdominal pain, constipation, diarrhea, nausea and vomiting. Genitourinary: Negative for difficulty urinating, frequency and hematuria. Musculoskeletal: Positive for arthralgias and joint swelling. Negative for back pain and neck pain.    Neurological: Negative for dizziness, tremors, facial asymmetry, light-headedness and headaches. Psychiatric/Behavioral: Negative for agitation, behavioral problems and confusion. The patient is nervous/anxious. Family History   Problem Relation Age of Onset    High Blood Pressure Mother     Cancer Sister         lymphoma       Objective:    /80 (Site: Left Upper Arm, Position: Sitting, Cuff Size: Large Adult)   Pulse 55   Temp 97.1 °F (36.2 °C) (Temporal)   Wt 261 lb 12.8 oz (118.8 kg)   LMP 11/09/2014   BMI 41.00 kg/m²    BP Readings from Last 3 Encounters:   07/21/20 130/80   07/01/20 135/87   06/29/20 (!) 151/93       Physical Exam  Vitals signs and nursing note reviewed. Constitutional:       Appearance: Normal appearance. She is normal weight. Cardiovascular:      Rate and Rhythm: Normal rate and regular rhythm. Pulses: Normal pulses. Heart sounds: Normal heart sounds. Pulmonary:      Effort: Pulmonary effort is normal.      Breath sounds: Normal breath sounds. Abdominal:      General: Abdomen is flat. Bowel sounds are normal.      Palpations: Abdomen is soft. Neurological:      General: No focal deficit present. Mental Status: She is alert and oriented to person, place, and time. Mental status is at baseline. Cranial Nerves: No cranial nerve deficit. Motor: Weakness present. Comments: Weakness of arms and legs    Psychiatric:         Mood and Affect: Mood normal.         Behavior: Behavior normal.         Thought Content:  Thought content normal.         Judgment: Judgment normal.       Lab Results   Component Value Date    WBC 5.7 06/17/2020    HGB 13.3 06/17/2020    HCT 42.4 06/17/2020     06/17/2020    CHOL 163 06/12/2020    TRIG 277 (H) 06/12/2020    HDL 33 (L) 06/12/2020    ALT 45 (H) 06/11/2020    AST 29 06/11/2020     06/17/2020    K 4.5 06/17/2020     06/17/2020    CREATININE 1.05 (H) 06/17/2020    BUN 24 (H) 06/17/2020    CO2 24 06/17/2020    INR 0.9 06/11/2020    LABA1C 9.3 (H) 06/12/2020     Lab Results   Component Value Date    CALCIUM 9.2 06/17/2020    PHOS 3.5 06/12/2020     Lab Results   Component Value Date    LDLCHOLESTEROL 75 06/12/2020       Assessment and Plan:    1. Encounter for screening mammogram for breast cancer  - [facility] Digital Screen Bilateral [[id number]]; Future    2. Need for prophylactic vaccination against diphtheria-tetanus-pertussis (DTP)  - Tdap (age 6y and older) IM (Boostrix)    3. Type 2 diabetes mellitus with hyperglycemia, with long-term current use of insulin (HCC)  - Microalbumin, Ur; Future    4. Fatigue, unspecified type  - Monica Nicholson MD, Neurology, [place]      Requested Prescriptions      No prescriptions requested or ordered in this encounter       There are no discontinued medications. Carlene received counseling on the following healthy behaviors: nutrition, exercise and medication adherence    Discussed use,benefit, and side effects of prescribed medications. Barriers to medication compliance addressed. All patient questions answered. Pt voiced understanding. Return in about 3 months (around 10/21/2020). Disclaimer: Some orall of this note was transcribed using voice-recognition software. This may cause typographical errors occasionally. Although all effort is made to fix these errors, please do not hesitate to contact our office if there Steward Machias concern with the understanding of this note.

## 2020-07-21 NOTE — PATIENT INSTRUCTIONS
Thank you for letting us take care of you today. We hope all your questions were addressed. If a question was overlooked or something else comes to mind after you return home, please contact a member of your Care Team listed below. Your Care Team at [place] [address] is Team #1  Alicia Falcon MD (Faculty)  Del Tobias (Resident)  Sherri Lundy MD (Resident)  Laura Benjamin., N  Tamika Gray, HALIMA  [facility] office)  Stanley Escoto, [facility] (Clinical Practice Manager)  Bob Dixon [facility] (Clinical Pharmacist)     Office phone number: [phone]    If you need to get in right away due to illness, please be advised we have \"Same Day\" appointments available Monday-Friday. Please call us at [phone] option #3 to schedule your \"Same Day\" appointment.

## 2020-07-30 RX ORDER — LABETALOL 200 MG/1
200 TABLET, FILM COATED ORAL EVERY 8 HOURS SCHEDULED
Qty: 60 TABLET | Refills: 0 | Status: SHIPPED | OUTPATIENT
Start: 2020-07-30 | End: 2020-08-28

## 2020-07-30 NOTE — TELEPHONE ENCOUNTER
Please address the medication refill and close the encounter. If I can be of assistance, please route to the applicable pool. Thank you. Last visit: 07/21/20  Last Med refill: 07/06/20  Does patient have enough medication for 72 hours: No:    Next Visit Date:  Future Appointments   Date Time Provider Mervin Kasper   8/27/2020  2:15 PM Stacy Israel MD [address] [place] [address] Maintenance   Topic Date Due    Pneumococcal 0-64 years Vaccine (1 of 1 - PPSV23) 02/15/1968    Diabetic foot exam  02/15/1972    Diabetic retinal exam  02/15/1972    HIV screen  02/15/1977    Cervical cancer screen  02/15/1983    Breast cancer screen  02/15/2012    Shingles Vaccine (1 of 2) 02/15/2012    Colon cancer screen colonoscopy  02/15/2012    Hepatitis B vaccine (3 of 3 - Risk 3-dose series) 07/22/2017    Flu vaccine (1) 09/01/2020    A1C test (Diabetic or Prediabetic)  09/12/2020    Lipid screen  06/12/2021    Potassium monitoring  06/17/2021    Creatinine monitoring  06/17/2021    Diabetic microalbuminuria test  07/21/2021    DTaP/Tdap/Td vaccine (2 - Td) 07/21/2030    Hepatitis A vaccine  Aged Out    Hib vaccine  Aged Out    Meningococcal (ACWY) vaccine  Aged Out       Hemoglobin A1C (%)   Date Value   06/12/2020 9.3 (H)             ( goal A1C is < 7)   Microalb/Crt.  Ratio (mcg/mg creat)   Date Value   07/21/2020 59 (H)     LDL Cholesterol (mg/dL)   Date Value   06/12/2020 75       (goal LDL is <100)   AST (U/L)   Date Value   06/11/2020 29     ALT (U/L)   Date Value   06/11/2020 45 (H)     BUN (mg/dL)   Date Value   06/17/2020 24 (H)     BP Readings from Last 3 Encounters:   07/21/20 130/80   07/01/20 135/87   06/29/20 (!) 151/93          (goal 120/80)    All Future Testing planned in [facility]  Lab Frequency Next Occurrence   PIERRE Digital Screen Bilateral [WMC6931] Once 07/31/2020   PIERRE Digital Screen Bilateral [[id number]] Once 08/20/2020               Patient Active Problem List:     Major depression     Exposure to hepatitis C     Chronic headache     Hypertension     Esophageal reflux     Hepatitis C     Elevated liver enzymes     Intracranial hemorrhage (HCC)     Pontine hemorrhage (HCC)     History of diabetes mellitus     History of hypertension     Type 2 diabetes mellitus with hyperglycemia, with long-term current use of insulin (HCC)     Primary osteoarthritis of both knees     Raised intracranial pressure     Renal impairment     Right lower quadrant abdominal pain     Uterine leiomyoma     Hypertensive urgency     Hypertensive heart disease without heart failure     Class 2 severe obesity with serious comorbidity and body mass index (BMI) of 39.0 to 39.9 in adult (HCC)     NANDA positive     Abnormal electrocardiogram     Open wound of left ankle     Trauma complication, late effect     Encounter for screening mammogram for breast cancer     Need for prophylactic vaccination against diphtheria-tetanus-pertussis (DTP)     Fatigue

## 2020-08-20 PROBLEM — Z12.31 ENCOUNTER FOR SCREENING MAMMOGRAM FOR BREAST CANCER: Status: RESOLVED | Noted: 2020-07-21 | Resolved: 2020-08-20

## 2020-08-27 NOTE — TELEPHONE ENCOUNTER
Last Visit Date:  7-21-20  Next Visit Date:  Visit date not found    Hemoglobin A1C (%)   Date Value   06/12/2020 9.3 (H)             ( goal A1C is < 7)   Microalb/Crt.  Ratio (mcg/mg creat)   Date Value   07/21/2020 59 (H)     LDL Cholesterol (mg/dL)   Date Value   06/12/2020 75       (goal LDL is <100)   AST (U/L)   Date Value   06/11/2020 29     ALT (U/L)   Date Value   06/11/2020 45 (H)     BUN (mg/dL)   Date Value   06/17/2020 24 (H)     BP Readings from Last 3 Encounters:   07/21/20 130/80   07/01/20 135/87   06/29/20 (!) 151/93          (goal 120/80)        Patient Active Problem List:     Major depression     Exposure to hepatitis C     Chronic headache     Hypertension     Esophageal reflux     Hepatitis C     Elevated liver enzymes     Intracranial hemorrhage (HCC)     Pontine hemorrhage (HCC)     History of diabetes mellitus     History of hypertension     Type 2 diabetes mellitus with hyperglycemia, with long-term current use of insulin (HCC)     Primary osteoarthritis of both knees     Raised intracranial pressure     Renal impairment     Right lower quadrant abdominal pain     Uterine leiomyoma     Hypertensive urgency     Hypertensive heart disease without heart failure     Class 2 severe obesity with serious comorbidity and body mass index (BMI) of 39.0 to 39.9 in adult (HCC)     NANDA positive     Abnormal electrocardiogram     Open wound of left ankle     Trauma complication, late effect     Need for prophylactic vaccination against diphtheria-tetanus-pertussis (DTP)     Fatigue      ----Angela Lundy

## 2020-08-28 RX ORDER — LABETALOL 200 MG/1
200 TABLET, FILM COATED ORAL EVERY 8 HOURS SCHEDULED
Qty: 60 TABLET | Refills: 0 | Status: SHIPPED | OUTPATIENT
Start: 2020-08-28 | End: 2020-10-05

## 2020-10-05 RX ORDER — LABETALOL 200 MG/1
200 TABLET, FILM COATED ORAL EVERY 8 HOURS SCHEDULED
Qty: 60 TABLET | Refills: 0 | Status: SHIPPED | OUTPATIENT
Start: 2020-10-05 | End: 2021-03-08

## 2020-10-05 NOTE — TELEPHONE ENCOUNTER
Last Visit Date:  7-21-20  Next Visit Date:  Visit date not found    Hemoglobin A1C (%)   Date Value   06/12/2020 9.3 (H)             ( goal A1C is < 7)   Microalb/Crt.  Ratio (mcg/mg creat)   Date Value   07/21/2020 59 (H)     LDL Cholesterol (mg/dL)   Date Value   06/12/2020 75       (goal LDL is <100)   AST (U/L)   Date Value   06/11/2020 29     ALT (U/L)   Date Value   06/11/2020 45 (H)     BUN (mg/dL)   Date Value   06/17/2020 24 (H)     BP Readings from Last 3 Encounters:   07/21/20 130/80   07/01/20 135/87   06/29/20 (!) 151/93          (goal 120/80)        Patient Active Problem List:     Major depression     Exposure to hepatitis C     Chronic headache     Hypertension     Esophageal reflux     Hepatitis C     Elevated liver enzymes     Intracranial hemorrhage (HCC)     Pontine hemorrhage (HCC)     History of diabetes mellitus     History of hypertension     Type 2 diabetes mellitus with hyperglycemia, with long-term current use of insulin (HCC)     Primary osteoarthritis of both knees     Raised intracranial pressure     Renal impairment     Right lower quadrant abdominal pain     Uterine leiomyoma     Hypertensive urgency     Hypertensive heart disease without heart failure     Class 2 severe obesity with serious comorbidity and body mass index (BMI) of 39.0 to 39.9 in adult (HCC)     NANDA positive     Abnormal electrocardiogram     Open wound of left ankle     Trauma complication, late effect     Need for prophylactic vaccination against diphtheria-tetanus-pertussis (DTP)     Fatigue      ----True Lower

## 2020-10-07 NOTE — TELEPHONE ENCOUNTER
Please address the medication refill and close the encounter. If I can be of assistance, please route to the applicable pool. Thank you. Last visit: 07/21/2020  Last Med refill: 07/06/20 pen needle    06/17/20 lantus    06/17/20 humalog  Does patient have enough medication for 72 hours: No:     Next Visit Date:  No future appointments. Health Maintenance   Topic Date Due    Pneumococcal 0-64 years Vaccine (1 of 1 - PPSV23) 02/15/1968    Diabetic foot exam  02/15/1972    Diabetic retinal exam  02/15/1972    HIV screen  02/15/1977    Cervical cancer screen  02/15/1983    Breast cancer screen  02/15/2012    Shingles Vaccine (1 of 2) 02/15/2012    Colon cancer screen colonoscopy  02/15/2012    Hepatitis B vaccine (3 of 3 - Risk 3-dose series) 07/22/2017    Flu vaccine (1) 09/01/2020    A1C test (Diabetic or Prediabetic)  09/12/2020    Lipid screen  06/12/2021    Potassium monitoring  06/17/2021    Creatinine monitoring  06/17/2021    Diabetic microalbuminuria test  07/21/2021    DTaP/Tdap/Td vaccine (2 - Td) 07/21/2030    Hepatitis A vaccine  Aged Out    Hib vaccine  Aged Out    Meningococcal (ACWY) vaccine  Aged Out       Hemoglobin A1C (%)   Date Value   06/12/2020 9.3 (H)             ( goal A1C is < 7)   Microalb/Crt.  Ratio (mcg/mg creat)   Date Value   07/21/2020 59 (H)     LDL Cholesterol (mg/dL)   Date Value   06/12/2020 75       (goal LDL is <100)   AST (U/L)   Date Value   06/11/2020 29     ALT (U/L)   Date Value   06/11/2020 45 (H)     BUN (mg/dL)   Date Value   06/17/2020 24 (H)     BP Readings from Last 3 Encounters:   07/21/20 130/80   07/01/20 135/87   06/29/20 (!) 151/93          (goal 120/80)    All Future Testing planned in [facility]  Lab Frequency Next Occurrence   PIERRE Digital Screen Bilateral [[id number]] Once 07/01/2021   PIERRE Digital Screen Bilateral [[id number]] Once 07/21/2021               Patient Active Problem List:     Major depression     Exposure to hepatitis C Chronic headache     Hypertension     Esophageal reflux     Hepatitis C     Elevated liver enzymes     Intracranial hemorrhage (HCC)     Pontine hemorrhage (HCC)     History of diabetes mellitus     History of hypertension     Type 2 diabetes mellitus with hyperglycemia, with long-term current use of insulin (HCC)     Primary osteoarthritis of both knees     Raised intracranial pressure     Renal impairment     Right lower quadrant abdominal pain     Uterine leiomyoma     Hypertensive urgency     Hypertensive heart disease without heart failure     Class 2 severe obesity with serious comorbidity and body mass index (BMI) of 39.0 to 39.9 in adult (HCC)     NANDA positive     Abnormal electrocardiogram     Open wound of left ankle     Trauma complication, late effect     Need for prophylactic vaccination against diphtheria-tetanus-pertussis (DTP)     Fatigue

## 2020-10-08 RX ORDER — INSULIN GLARGINE 100 [IU]/ML
15 INJECTION, SOLUTION SUBCUTANEOUS NIGHTLY
Qty: 1 VIAL | Refills: 0 | Status: SHIPPED | OUTPATIENT
Start: 2020-10-08

## 2020-10-08 RX ORDER — PEN NEEDLE, DIABETIC 29 GAUGE
1 NEEDLE, DISPOSABLE MISCELLANEOUS DAILY
Qty: 100 EACH | Refills: 0 | Status: SHIPPED | OUTPATIENT
Start: 2020-10-08

## 2021-03-08 RX ORDER — LABETALOL 200 MG/1
200 TABLET, FILM COATED ORAL EVERY 8 HOURS SCHEDULED
Qty: 60 TABLET | Refills: 0 | Status: SHIPPED | OUTPATIENT
Start: 2021-03-08 | End: 2021-05-27

## 2021-03-08 NOTE — TELEPHONE ENCOUNTER
E-scribe request for labetalol. Please review and e-scribe if applicable. Last Visit Date: 07/21/2020  Next Visit Date:  Visit date not found    Hemoglobin A1C (%)   Date Value   06/12/2020 9.3 (H)             ( goal A1C is < 7)   Microalb/Crt.  Ratio (mcg/mg creat)   Date Value   07/21/2020 59 (H)     LDL Cholesterol (mg/dL)   Date Value   06/12/2020 75       (goal LDL is <100)   AST (U/L)   Date Value   06/11/2020 29     ALT (U/L)   Date Value   06/11/2020 45 (H)     BUN (mg/dL)   Date Value   06/17/2020 24 (H)     BP Readings from Last 3 Encounters:   07/21/20 130/80   07/01/20 135/87   06/29/20 (!) 151/93          (goal 120/80)        Patient Active Problem List:     Major depression     Exposure to hepatitis C     Chronic headache     Hypertension     Esophageal reflux     Hepatitis C     Elevated liver enzymes     Intracranial hemorrhage (HCC)     Pontine hemorrhage (HCC)     History of diabetes mellitus     History of hypertension     Type 2 diabetes mellitus with hyperglycemia, with long-term current use of insulin (HCC)     Primary osteoarthritis of both knees     Raised intracranial pressure     Renal impairment     Right lower quadrant abdominal pain     Uterine leiomyoma     Hypertensive urgency     Hypertensive heart disease without heart failure     Class 2 severe obesity with serious comorbidity and body mass index (BMI) of 39.0 to 39.9 in adult (HCC)     NANDA positive     Abnormal electrocardiogram     Open wound of left ankle     Trauma complication, late effect     Need for prophylactic vaccination against diphtheria-tetanus-pertussis (DTP)     Fatigue

## 2021-05-27 RX ORDER — LABETALOL 200 MG/1
200 TABLET, FILM COATED ORAL EVERY 8 HOURS SCHEDULED
Qty: 60 TABLET | Refills: 0 | Status: SHIPPED | OUTPATIENT
Start: 2021-05-27

## 2021-05-27 NOTE — TELEPHONE ENCOUNTER
E-scribe request for labetalol. Please review and e-scribe if applicable. Last Visit Date:  07/21/2020  Next Visit Date:  Visit date not found    Hemoglobin A1C (%)   Date Value   06/12/2020 9.3 (H)             ( goal A1C is < 7)   Microalb/Crt.  Ratio (mcg/mg creat)   Date Value   07/21/2020 59 (H)     LDL Cholesterol (mg/dL)   Date Value   06/12/2020 75       (goal LDL is <100)   AST (U/L)   Date Value   06/11/2020 29     ALT (U/L)   Date Value   06/11/2020 45 (H)     BUN (mg/dL)   Date Value   06/17/2020 24 (H)     BP Readings from Last 3 Encounters:   07/21/20 130/80   07/01/20 135/87   06/29/20 (!) 151/93          (goal 120/80)        Patient Active Problem List:     Major depression     Exposure to hepatitis C     Chronic headache     Hypertension     Esophageal reflux     Hepatitis C     Elevated liver enzymes     Intracranial hemorrhage (HCC)     Pontine hemorrhage (HCC)     History of diabetes mellitus     History of hypertension     Type 2 diabetes mellitus with hyperglycemia, with long-term current use of insulin (HCC)     Primary osteoarthritis of both knees     Raised intracranial pressure     Renal impairment     Right lower quadrant abdominal pain     Uterine leiomyoma     Hypertensive urgency     Hypertensive heart disease without heart failure     Class 2 severe obesity with serious comorbidity and body mass index (BMI) of 39.0 to 39.9 in adult (HCC)     NANDA positive     Abnormal electrocardiogram     Open wound of left ankle     Trauma complication, late effect     Need for prophylactic vaccination against diphtheria-tetanus-pertussis (DTP)     Fatigue      ----Maria L Stammer